# Patient Record
Sex: MALE | Race: WHITE | Employment: OTHER | URBAN - NONMETROPOLITAN AREA
[De-identification: names, ages, dates, MRNs, and addresses within clinical notes are randomized per-mention and may not be internally consistent; named-entity substitution may affect disease eponyms.]

---

## 2017-03-23 PROBLEM — F10.20 ALCOHOLIC (HCC): Status: ACTIVE | Noted: 2017-03-23

## 2017-03-23 PROBLEM — I34.2 NON-RHEUMATIC MITRAL VALVE STENOSIS: Status: ACTIVE | Noted: 2017-03-23

## 2017-03-27 PROBLEM — Z98.890 S/P CARDIAC CATHETERIZATION: Status: ACTIVE | Noted: 2017-03-27

## 2017-03-28 ENCOUNTER — TELEPHONE (OUTPATIENT)
Dept: CARDIOLOGY | Age: 62
End: 2017-03-28

## 2017-04-07 ENCOUNTER — TELEPHONE (OUTPATIENT)
Dept: CARDIOLOGY | Age: 62
End: 2017-04-07

## 2017-04-19 ENCOUNTER — TELEPHONE (OUTPATIENT)
Dept: CARDIOLOGY | Age: 62
End: 2017-04-19

## 2017-05-12 PROBLEM — Z45.02 DEFIBRILLATOR DISCHARGE: Status: ACTIVE | Noted: 2017-05-12

## 2017-05-13 ENCOUNTER — TELEPHONE (OUTPATIENT)
Dept: FAMILY MEDICINE CLINIC | Age: 62
End: 2017-05-13

## 2017-05-15 ENCOUNTER — TELEPHONE (OUTPATIENT)
Dept: INTERNAL MEDICINE | Age: 62
End: 2017-05-15

## 2017-08-24 ENCOUNTER — HOSPITAL ENCOUNTER (EMERGENCY)
Age: 62
Discharge: HOME | End: 2017-08-24
Payer: MEDICAID

## 2017-08-24 VITALS
OXYGEN SATURATION: 96 % | WEIGHT: 146 LBS | SYSTOLIC BLOOD PRESSURE: 148 MMHG | DIASTOLIC BLOOD PRESSURE: 67 MMHG | BODY MASS INDEX: 25.06 KG/M2 | RESPIRATION RATE: 18 BRPM | HEART RATE: 85 BPM | TEMPERATURE: 98.2 F

## 2017-08-24 DIAGNOSIS — L02.411 CUTANEOUS ABSCESS OF RIGHT AXILLA: Primary | ICD-10-CM

## 2017-08-24 PROCEDURE — 99202 OFFICE O/P NEW SF 15 MIN: CPT | Performed by: NURSE PRACTITIONER

## 2017-08-24 PROCEDURE — 99212 OFFICE O/P EST SF 10 MIN: CPT

## 2017-08-24 RX ORDER — LIDOCAINE 50 MG/G
OINTMENT TOPICAL
Qty: 10 G | Refills: 0 | Status: ON HOLD | OUTPATIENT
Start: 2017-08-24 | End: 2017-10-26

## 2017-08-24 RX ORDER — AMOXICILLIN AND CLAVULANATE POTASSIUM 875; 125 MG/1; MG/1
1 TABLET, FILM COATED ORAL 2 TIMES DAILY
Qty: 20 TABLET | Refills: 0 | Status: SHIPPED | OUTPATIENT
Start: 2017-08-24 | End: 2017-09-03

## 2017-08-24 ASSESSMENT — ENCOUNTER SYMPTOMS
SHORTNESS OF BREATH: 0
CHEST TIGHTNESS: 0
NAUSEA: 0
COLOR CHANGE: 1
COUGH: 0
VOMITING: 0

## 2017-10-25 ENCOUNTER — HOSPITAL ENCOUNTER (INPATIENT)
Age: 62
LOS: 5 days | Discharge: HOME | DRG: 720 | End: 2017-10-30
Attending: FAMILY MEDICINE | Admitting: INTERNAL MEDICINE
Payer: MEDICAID

## 2017-10-25 ENCOUNTER — APPOINTMENT (OUTPATIENT)
Dept: GENERAL RADIOLOGY | Age: 62
DRG: 720 | End: 2017-10-25
Payer: MEDICAID

## 2017-10-25 ENCOUNTER — APPOINTMENT (OUTPATIENT)
Dept: CT IMAGING | Age: 62
DRG: 720 | End: 2017-10-25
Payer: MEDICAID

## 2017-10-25 DIAGNOSIS — I26.99 PE (PULMONARY THROMBOEMBOLISM) (HCC): Primary | ICD-10-CM

## 2017-10-25 DIAGNOSIS — J44.1 COPD EXACERBATION (HCC): ICD-10-CM

## 2017-10-25 DIAGNOSIS — J18.9 PNEUMONIA DUE TO ORGANISM: ICD-10-CM

## 2017-10-25 DIAGNOSIS — Z72.0 TOBACCO ABUSE: ICD-10-CM

## 2017-10-25 DIAGNOSIS — M48.56XA NON-TRAUMATIC COMPRESSION FRACTURE OF SECOND LUMBAR VERTEBRA, INITIAL ENCOUNTER: ICD-10-CM

## 2017-10-25 PROBLEM — A41.9 SEPSIS (HCC): Status: ACTIVE | Noted: 2017-10-25

## 2017-10-25 LAB
ACETAMINOPHEN LEVEL: < 5 UG/ML (ref 0–20)
ALBUMIN SERPL-MCNC: 2.6 G/DL (ref 3.5–5.1)
ALLEN TEST: POSITIVE
ALP BLD-CCNC: 74 U/L (ref 38–126)
ALT SERPL-CCNC: 15 U/L (ref 11–66)
ANION GAP SERPL CALCULATED.3IONS-SCNC: 13 MEQ/L (ref 8–16)
ANISOCYTOSIS: ABNORMAL
APTT: 30.6 SECONDS (ref 22–38)
AST SERPL-CCNC: 23 U/L (ref 5–40)
BASE EXCESS (CALCULATED): 1.9 MMOL/L (ref -2.5–2.5)
BASOPHILS # BLD: 0.2 %
BASOPHILS ABSOLUTE: 0 THOU/MM3 (ref 0–0.1)
BILIRUB SERPL-MCNC: 0.6 MG/DL (ref 0.3–1.2)
BILIRUBIN DIRECT: < 0.2 MG/DL (ref 0–0.3)
BUN BLDV-MCNC: 11 MG/DL (ref 7–22)
CALCIUM SERPL-MCNC: 9.1 MG/DL (ref 8.5–10.5)
CHLORIDE BLD-SCNC: 87 MEQ/L (ref 98–111)
CO2: 25 MEQ/L (ref 23–33)
COLLECTED BY:: ABNORMAL
CREAT SERPL-MCNC: 0.6 MG/DL (ref 0.4–1.2)
DEVICE: ABNORMAL
EOSINOPHIL # BLD: 0 %
EOSINOPHILS ABSOLUTE: 0 THOU/MM3 (ref 0–0.4)
ETHYL ALCOHOL, SERUM: < 0.01 %
FLU A ANTIGEN: NEGATIVE
FLU B ANTIGEN: NEGATIVE
GFR SERPL CREATININE-BSD FRML MDRD: > 90 ML/MIN/1.73M2
GLUCOSE BLD-MCNC: 99 MG/DL (ref 70–108)
HCO3: 25 MMOL/L (ref 23–28)
HCT VFR BLD CALC: 38.7 % (ref 42–52)
HEMOGLOBIN: 13.2 GM/DL (ref 14–18)
IFIO2: 2
INR BLD: 1.15 (ref 0.85–1.13)
LACTIC ACID: 1 MMOL/L (ref 0.5–2.2)
LIPASE: 21.8 U/L (ref 5.6–51.3)
LYMPHOCYTES # BLD: 2.4 %
LYMPHOCYTES ABSOLUTE: 0.5 THOU/MM3 (ref 1–4.8)
MAGNESIUM: 1.9 MG/DL (ref 1.6–2.4)
MCH RBC QN AUTO: 31.8 PG (ref 27–31)
MCHC RBC AUTO-ENTMCNC: 34 GM/DL (ref 33–37)
MCV RBC AUTO: 93.6 FL (ref 80–94)
MONOCYTES # BLD: 7.8 %
MONOCYTES ABSOLUTE: 1.5 THOU/MM3 (ref 0.4–1.3)
NUCLEATED RED BLOOD CELLS: 0 /100 WBC
O2 SATURATION: 93 %
OSMOLALITY CALCULATION: 250.9 MOSMOL/KG (ref 275–300)
PCO2: 35 MMHG (ref 35–45)
PDW BLD-RTO: 15.5 % (ref 11.5–14.5)
PH BLOOD GAS: 7.47 (ref 7.35–7.45)
PLATELET # BLD: 274 THOU/MM3 (ref 130–400)
PMV BLD AUTO: 8.5 MCM (ref 7.4–10.4)
PO2: 61 MMHG (ref 71–104)
POTASSIUM SERPL-SCNC: 4.6 MEQ/L (ref 3.5–5.2)
PRO-BNP: ABNORMAL PG/ML (ref 0–900)
PROCALCITONIN: 1.31 NG/ML (ref 0.01–0.09)
RBC # BLD: 4.14 MILL/MM3 (ref 4.7–6.1)
SALICYLATE, SERUM: < 0.3 MG/DL (ref 2–10)
SEG NEUTROPHILS: 89.6 %
SEGMENTED NEUTROPHILS ABSOLUTE COUNT: 17.1 THOU/MM3 (ref 1.8–7.7)
SODIUM BLD-SCNC: 125 MEQ/L (ref 135–145)
SOURCE, BLOOD GAS: ABNORMAL
TOTAL PROTEIN: 6.7 G/DL (ref 6.1–8)
TROPONIN T: < 0.01 NG/ML
WBC # BLD: 19.1 THOU/MM3 (ref 4.8–10.8)

## 2017-10-25 PROCEDURE — 87077 CULTURE AEROBIC IDENTIFY: CPT

## 2017-10-25 PROCEDURE — 83605 ASSAY OF LACTIC ACID: CPT

## 2017-10-25 PROCEDURE — 87081 CULTURE SCREEN ONLY: CPT

## 2017-10-25 PROCEDURE — 80053 COMPREHEN METABOLIC PANEL: CPT

## 2017-10-25 PROCEDURE — 96365 THER/PROPH/DIAG IV INF INIT: CPT

## 2017-10-25 PROCEDURE — 83735 ASSAY OF MAGNESIUM: CPT

## 2017-10-25 PROCEDURE — 83880 ASSAY OF NATRIURETIC PEPTIDE: CPT

## 2017-10-25 PROCEDURE — 82803 BLOOD GASES ANY COMBINATION: CPT

## 2017-10-25 PROCEDURE — 2580000003 HC RX 258: Performed by: FAMILY MEDICINE

## 2017-10-25 PROCEDURE — A6212 FOAM DRG <=16 SQ IN W/BORDER: HCPCS

## 2017-10-25 PROCEDURE — 71010 XR CHEST PORTABLE: CPT

## 2017-10-25 PROCEDURE — 74177 CT ABD & PELVIS W/CONTRAST: CPT

## 2017-10-25 PROCEDURE — 85730 THROMBOPLASTIN TIME PARTIAL: CPT

## 2017-10-25 PROCEDURE — 93005 ELECTROCARDIOGRAM TRACING: CPT

## 2017-10-25 PROCEDURE — 96375 TX/PRO/DX INJ NEW DRUG ADDON: CPT

## 2017-10-25 PROCEDURE — 6360000004 HC RX CONTRAST MEDICATION: Performed by: FAMILY MEDICINE

## 2017-10-25 PROCEDURE — 6360000002 HC RX W HCPCS: Performed by: FAMILY MEDICINE

## 2017-10-25 PROCEDURE — G0480 DRUG TEST DEF 1-7 CLASSES: HCPCS

## 2017-10-25 PROCEDURE — 85610 PROTHROMBIN TIME: CPT

## 2017-10-25 PROCEDURE — 84484 ASSAY OF TROPONIN QUANT: CPT

## 2017-10-25 PROCEDURE — 99285 EMERGENCY DEPT VISIT HI MDM: CPT

## 2017-10-25 PROCEDURE — 82248 BILIRUBIN DIRECT: CPT

## 2017-10-25 PROCEDURE — 87641 MR-STAPH DNA AMP PROBE: CPT

## 2017-10-25 PROCEDURE — 85025 COMPLETE CBC W/AUTO DIFF WBC: CPT

## 2017-10-25 PROCEDURE — 87804 INFLUENZA ASSAY W/OPTIC: CPT

## 2017-10-25 PROCEDURE — 36600 WITHDRAWAL OF ARTERIAL BLOOD: CPT

## 2017-10-25 PROCEDURE — 87186 SC STD MICRODIL/AGAR DIL: CPT

## 2017-10-25 PROCEDURE — 36415 COLL VENOUS BLD VENIPUNCTURE: CPT

## 2017-10-25 PROCEDURE — 83690 ASSAY OF LIPASE: CPT

## 2017-10-25 PROCEDURE — 87040 BLOOD CULTURE FOR BACTERIA: CPT

## 2017-10-25 PROCEDURE — 87184 SC STD DISK METHOD PER PLATE: CPT

## 2017-10-25 PROCEDURE — 99223 1ST HOSP IP/OBS HIGH 75: CPT | Performed by: INTERNAL MEDICINE

## 2017-10-25 PROCEDURE — 84145 PROCALCITONIN (PCT): CPT

## 2017-10-25 PROCEDURE — 2060000000 HC ICU INTERMEDIATE R&B

## 2017-10-25 PROCEDURE — 71275 CT ANGIOGRAPHY CHEST: CPT

## 2017-10-25 RX ORDER — ONDANSETRON 2 MG/ML
4 INJECTION INTRAMUSCULAR; INTRAVENOUS EVERY 6 HOURS PRN
Status: DISCONTINUED | OUTPATIENT
Start: 2017-10-25 | End: 2017-10-30 | Stop reason: HOSPADM

## 2017-10-25 RX ORDER — THIAMINE MONONITRATE (VIT B1) 100 MG
100 TABLET ORAL DAILY
Status: DISCONTINUED | OUTPATIENT
Start: 2017-10-26 | End: 2017-10-30 | Stop reason: HOSPADM

## 2017-10-25 RX ORDER — HEPARIN SODIUM 10000 [USP'U]/100ML
18 INJECTION, SOLUTION INTRAVENOUS CONTINUOUS
Status: DISCONTINUED | OUTPATIENT
Start: 2017-10-25 | End: 2017-10-27

## 2017-10-25 RX ORDER — SODIUM CHLORIDE 9 MG/ML
INJECTION, SOLUTION INTRAVENOUS CONTINUOUS
Status: DISCONTINUED | OUTPATIENT
Start: 2017-10-26 | End: 2017-10-26

## 2017-10-25 RX ORDER — DOCUSATE SODIUM 100 MG/1
100 CAPSULE, LIQUID FILLED ORAL 2 TIMES DAILY
Status: DISCONTINUED | OUTPATIENT
Start: 2017-10-26 | End: 2017-10-30 | Stop reason: HOSPADM

## 2017-10-25 RX ORDER — HEPARIN SODIUM 1000 [USP'U]/ML
80 INJECTION, SOLUTION INTRAVENOUS; SUBCUTANEOUS ONCE
Status: COMPLETED | OUTPATIENT
Start: 2017-10-25 | End: 2017-10-25

## 2017-10-25 RX ORDER — SODIUM CHLORIDE 0.9 % (FLUSH) 0.9 %
10 SYRINGE (ML) INJECTION EVERY 12 HOURS SCHEDULED
Status: DISCONTINUED | OUTPATIENT
Start: 2017-10-26 | End: 2017-10-30 | Stop reason: HOSPADM

## 2017-10-25 RX ORDER — HEPARIN SODIUM 1000 [USP'U]/ML
80 INJECTION, SOLUTION INTRAVENOUS; SUBCUTANEOUS PRN
Status: DISCONTINUED | OUTPATIENT
Start: 2017-10-25 | End: 2017-10-27

## 2017-10-25 RX ORDER — SODIUM CHLORIDE 0.9 % (FLUSH) 0.9 %
10 SYRINGE (ML) INJECTION PRN
Status: DISCONTINUED | OUTPATIENT
Start: 2017-10-25 | End: 2017-10-30 | Stop reason: HOSPADM

## 2017-10-25 RX ORDER — ACETAMINOPHEN 325 MG/1
650 TABLET ORAL EVERY 4 HOURS PRN
Status: DISCONTINUED | OUTPATIENT
Start: 2017-10-25 | End: 2017-10-30 | Stop reason: HOSPADM

## 2017-10-25 RX ORDER — FOLIC ACID 1 MG/1
1 TABLET ORAL DAILY
Status: DISCONTINUED | OUTPATIENT
Start: 2017-10-26 | End: 2017-10-30 | Stop reason: HOSPADM

## 2017-10-25 RX ORDER — NICOTINE 21 MG/24HR
1 PATCH, TRANSDERMAL 24 HOURS TRANSDERMAL DAILY
Status: DISCONTINUED | OUTPATIENT
Start: 2017-10-26 | End: 2017-10-30 | Stop reason: HOSPADM

## 2017-10-25 RX ORDER — SODIUM CHLORIDE 9 MG/ML
INJECTION, SOLUTION INTRAVENOUS CONTINUOUS
Status: DISCONTINUED | OUTPATIENT
Start: 2017-10-25 | End: 2017-10-26 | Stop reason: SDUPTHER

## 2017-10-25 RX ORDER — MULTIVITAMIN WITH FOLIC ACID 400 MCG
1 TABLET ORAL DAILY
Status: DISCONTINUED | OUTPATIENT
Start: 2017-10-26 | End: 2017-10-30 | Stop reason: HOSPADM

## 2017-10-25 RX ORDER — HEPARIN SODIUM 1000 [USP'U]/ML
40 INJECTION, SOLUTION INTRAVENOUS; SUBCUTANEOUS PRN
Status: DISCONTINUED | OUTPATIENT
Start: 2017-10-25 | End: 2017-10-27

## 2017-10-25 RX ORDER — CARVEDILOL 3.12 MG/1
3.12 TABLET ORAL 2 TIMES DAILY WITH MEALS
Status: DISCONTINUED | OUTPATIENT
Start: 2017-10-26 | End: 2017-10-30 | Stop reason: HOSPADM

## 2017-10-25 RX ORDER — METHYLPREDNISOLONE SODIUM SUCCINATE 125 MG/2ML
80 INJECTION, POWDER, LYOPHILIZED, FOR SOLUTION INTRAMUSCULAR; INTRAVENOUS EVERY 8 HOURS
Status: DISCONTINUED | OUTPATIENT
Start: 2017-10-26 | End: 2017-10-26

## 2017-10-25 RX ORDER — DIGOXIN 125 MCG
125 TABLET ORAL DAILY
Status: DISCONTINUED | OUTPATIENT
Start: 2017-10-26 | End: 2017-10-30 | Stop reason: HOSPADM

## 2017-10-25 RX ORDER — IPRATROPIUM BROMIDE AND ALBUTEROL SULFATE 2.5; .5 MG/3ML; MG/3ML
1 SOLUTION RESPIRATORY (INHALATION)
Status: DISCONTINUED | OUTPATIENT
Start: 2017-10-26 | End: 2017-10-30 | Stop reason: HOSPADM

## 2017-10-25 RX ORDER — LISINOPRIL 5 MG/1
5 TABLET ORAL DAILY
Status: DISCONTINUED | OUTPATIENT
Start: 2017-10-26 | End: 2017-10-30 | Stop reason: HOSPADM

## 2017-10-25 RX ADMIN — CEFTRIAXONE 2 G: 2 INJECTION, POWDER, FOR SOLUTION INTRAMUSCULAR; INTRAVENOUS at 20:44

## 2017-10-25 RX ADMIN — SODIUM CHLORIDE: 9 INJECTION, SOLUTION INTRAVENOUS at 18:42

## 2017-10-25 RX ADMIN — IOPAMIDOL 80 ML: 755 INJECTION, SOLUTION INTRAVENOUS at 20:24

## 2017-10-25 RX ADMIN — AZITHROMYCIN MONOHYDRATE 500 MG: 500 INJECTION, POWDER, LYOPHILIZED, FOR SOLUTION INTRAVENOUS at 20:44

## 2017-10-25 RX ADMIN — HEPARIN SODIUM 5800 UNITS: 1000 INJECTION, SOLUTION INTRAVENOUS; SUBCUTANEOUS at 21:11

## 2017-10-25 RX ADMIN — HEPARIN SODIUM AND DEXTROSE 18 UNITS/KG/HR: 10000; 5 INJECTION INTRAVENOUS at 21:11

## 2017-10-25 ASSESSMENT — ENCOUNTER SYMPTOMS
ABDOMINAL PAIN: 0
COUGH: 1
NAUSEA: 0
EYE REDNESS: 0
VOMITING: 0
BACK PAIN: 1
RHINORRHEA: 0
DIARRHEA: 0
SORE THROAT: 0
SHORTNESS OF BREATH: 1
EYE DISCHARGE: 0
WHEEZING: 0

## 2017-10-25 ASSESSMENT — PAIN DESCRIPTION - LOCATION: LOCATION: BACK;CHEST

## 2017-10-25 ASSESSMENT — PAIN SCALES - GENERAL: PAINLEVEL_OUTOF10: 6

## 2017-10-25 ASSESSMENT — PAIN DESCRIPTION - PAIN TYPE: TYPE: ACUTE PAIN

## 2017-10-25 NOTE — Clinical Note
Patient Class: Inpatient [101]   REQUIRED: Diagnosis: Pulmonary embolism, bilateral [facility]) [[id number]]   Estimated Length of Stay: Estimated stay of more than 2 midnights   Future Attending Provider: Jose Posada [[id number]]   Admitting Provider: Jose Posada [[id number]]   Telemetry Bed Required?: Yes

## 2017-10-25 NOTE — ED PROVIDER NOTES
Musculoskeletal: Positive for back pain. Negative for arthralgias, joint swelling and neck pain. Skin: Negative for pallor and rash. Allergic/Immunologic: Negative for environmental allergies. Neurological: Negative for dizziness, syncope, weakness, light-headedness and headaches. Hematological: Negative for adenopathy. Psychiatric/Behavioral: Negative for agitation, confusion, dysphoric mood and suicidal ideas. The patient is not nervous/anxious. PAST MEDICAL HISTORY    has a past medical history of Brain tumor [facility]); COPD (chronic obstructive pulmonary disease) ([facility] [address] Coronary artery disease involving native coronary artery of native heart without angina pectoris; Essential hypertension; and S/P cardiac catheterization: 3/27/2017: 40-50% mid-LAD, 60-70% mid-Ramus with FFR of 0.9, and 50% mid-RCA. LVEF 25-30%. Severe AI 3+. .    SURGICAL HISTORY      has a past surgical history that includes Colonoscopy and brain surgery. CURRENT MEDICATIONS       Current Discharge Medication List      CONTINUE these medications which have NOT CHANGED    Details   Heating Pads (HEATING PAD DRY HEAT) PADS 1 Device by Does not apply route as needed (pain)  Qty: 1 each, Refills: 0      lidocaine (XYLOCAINE) 5 % ointment Apply topically as needed.   Qty: 10 g, Refills: 0      aspirin 325 MG EC tablet Take 1 tablet by mouth daily  Qty: 30 tablet, Refills: 3      lisinopril (PRINIVIL;ZESTRIL) 5 MG tablet Take 1 tablet by mouth daily  Qty: 30 tablet, Refills: 0      carvedilol (COREG) 3.125 MG tablet Take 1 tablet by mouth 2 times daily (with meals)  Qty: 60 tablet, Refills: 0      digoxin (LANOXIN) 125 MCG tablet Take 1 tablet by mouth daily  Qty: 30 tablet, Refills: 0      spironolactone (ALDACTONE) 25 MG tablet Take 1 tablet by mouth daily  Qty: 30 tablet, Refills: 0      vitamin B-1 100 MG tablet Take 1 tablet by mouth daily  Qty: 30 tablet, Refills: 0      Multiple Vitamin (MULTIVITAMIN) tablet Take 1 tablet by and no guarding. Musculoskeletal: Normal range of motion. He exhibits no edema. Neurological: He is alert and oriented to person, place, and time. He exhibits normal muscle tone. He displays no seizure activity. GCS eye subscore is 4. GCS verbal subscore is 5. GCS motor subscore is 6. Skin: Skin is warm and dry. No rash noted. He is not diaphoretic. Psychiatric: He has a normal mood and affect. His behavior is normal. Thought content normal.       DIFFERENTIAL DIAGNOSIS not limited to:   Pneumonia, pneumothorax, ACS, COPD, colitis, gastritis, aortic dissection, PE    DIAGNOSTIC RESULTS     EKG: All EKG's are interpreted by the Emergency Department Physician who either signs or Co-signs this chart in the absence of a cardiologist.  EKG read and interpreted by myself gives impression of sinus tachycardia with heart rate of 106; interval 124; QRS 98;QTc 448; axis 56; No STEMI; possible left atrial enlargement; left ventricular hypertrophy with repolarization abnormality     RADIOLOGY: non-plain film images(s) such as CT, Ultrasound and MRI are read by the radiologist.  CTA Chest W WO Contrast   Final Result   Addendum 1 of 1    ADDENDUM #1      The reported L1 compression fracture is actually a compression fracture of    T12. Final report electronically signed by Dr. Paul Scott on 10/25/2017    8:56 PM       ORIGINAL REPORT    PROCEDURE: CTA CHEST W WO CONTRAST      CLINICAL INFORMATION: Chest pain      TECHNIQUE: CTA of the chest was performed following administration of 80    mL Isovue-370 intravenous contrast. Axial images as well as coronal and    sagittal reconstructions were obtained. All CT scans at this facility use dose modulation, iterative    reconstruction, and/or weight-based dosing when appropriate to reduce    radiation dose to as low as reasonably achievable.       COMPARISON: None      FINDINGS: There is acute thrombus extending from the right pulmonary    artery into right middle Apparent mural thickening of the sigmoid colon which may be secondary to underdistention. However, a neoplastic process cannot be excluded and direct visualization is recommended. 5. Grade 4 T12 compression fracture of indeterminate age. 6. Sclerotic lesions at the L3 vertebra suspicious for metastatic disease. 7. Small metallic density posterior to the L4 vertebra of indeterminate etiology but possibly related to prior procedure. Final report electronically signed by Dr. Torey Zavala on 10/25/2017 8:55 PM      XR Chest Portable   Final Result   Left mid and upper lung pneumonia. **This report has been created using voice recognition software. It may contain minor errors which are inherent in voice recognition technology. **      Final report electronically signed by Dr. Torey Zavala on 10/25/2017 6:58 PM          LABS:   Results for orders placed or performed during the hospital encounter of 10/25/17   Rapid influenza A/B antigens   Result Value Ref Range    Flu A Antigen NEGATIVE NEGATIVE    Flu B Antigen NEGATIVE NEGATIVE   Magnesium   Result Value Ref Range    Magnesium 1.9 1.6 - 2.4 mg/dL   CBC auto differential   Result Value Ref Range    WBC 19.1 (H) 4.8 - 10.8 thou/mm3    RBC 4.14 (L) 4.70 - 6.10 mill/mm3    Hemoglobin 13.2 (L) 14.0 - 18.0 gm/dl    Hematocrit 38.7 (L) 42.0 - 52.0 %    MCV 93.6 80.0 - 94.0 fL    MCH 31.8 (H) 27.0 - 31.0 pg    MCHC 34.0 33.0 - 37.0 gm/dl    RDW 15.5 (H) 11.5 - 14.5 %    Platelets 883 904 - 602 thou/mm3    MPV 8.5 7.4 - 10.4 mcm    Seg Neutrophils 89.6 %    Lymphocytes 2.4 %    Monocytes 7.8 %    Eosinophils 0.0 %    Basophils 0.2 %    nRBC 0 /100 wbc    Anisocytosis 1+ Absent    Segs Absolute 17.1 (H) 1.8 - 7.7 thou/mm3    Lymphocytes # 0.5 (L) 1.0 - 4.8 thou/mm3    Monocytes # 1.5 (H) 0.4 - 1.3 thou/mm3    Eosinophils # 0.0 0.0 - 0.4 thou/mm3    Basophils # 0.0 0.0 - 0.1 thou/mm3   Basic Metabolic Panel   Result Value Ref Range    Sodium 125 (L) 135 - 145 meq/L    Potassium 4.6 3.5 - 5.2 meq/L    Chloride 87 (L) 98 - 111 meq/L    CO2 25 23 - 33 meq/L    Glucose 99 70 - 108 mg/dL    BUN 11 7 - 22 mg/dL    CREATININE 0.6 0.4 - 1.2 mg/dL    Calcium 9.1 8.5 - 10.5 mg/dL   Hepatic function panel   Result Value Ref Range    Alb 2.6 (L) 3.5 - 5.1 g/dL    Total Bilirubin 0.6 0.3 - 1.2 mg/dL    Bilirubin, Direct <0.2 0.0 - 0.3 mg/dL    Alkaline Phosphatase 74 38 - 126 U/L    AST 23 5 - 40 U/L    ALT 15 11 - 66 U/L    Total Protein 6.7 6.1 - 8.0 g/dL   Ethanol   Result Value Ref Range    ETHYL ALCOHOL, SERUM < 0.01 0.00 %   Lipase   Result Value Ref Range    Lipase 21.8 5.6 - 44.4 U/L   Salicylate Level   Result Value Ref Range    Salicylate, Serum < 0.3 (L) 2.0 - 10.0 mg/dL   Acetaminophen level   Result Value Ref Range    Acetaminophen Level < 5.0 0.0 - 20.0 ug/mL   Troponin   Result Value Ref Range    Troponin T < 0.010 ng/ml   Brain Natriuretic Peptide   Result Value Ref Range    Pro-BNP 39570.0 (H) 0.0 - 900.0 pg/mL   Anion Gap   Result Value Ref Range    Anion Gap 13.0 8.0 - 16.0 meq/L   Osmolality   Result Value Ref Range    Osmolality Calc 250.9 (L) 275.0 - 300 mOsmol/kg   Glomerular Filtration Rate, Estimated   Result Value Ref Range    Est, Glom Filt Rate >90 ml/min/1.73m2   Procalcitonin   Result Value Ref Range    Procalcitonin 1.31 (H) 0.01 - 0.09 ng/mL   Blood Gas, Arterial   Result Value Ref Range    pH, Blood Gas 7.47 (H) 7.35 - 7.45    PCO2 35 35 - 45 mmhg    PO2 61 (L) 71 - 104 mmhg    HCO3 25 23 - 28 mmol/l    Base Excess (Calculated) 1.9 -2.5 - 2.5 mmol/l    O2 Sat 93 %    IFIO2 2     DEVICE Cannula     Piyush Test Positive     Source: L Radial     COLLECTED BY: [id number]    APTT   Result Value Ref Range    aPTT 30.6 22.0 - 38.0 seconds   Protime-INR   Result Value Ref Range    INR 1.15 (H) 0.85 - 1.13   Lactic Acid, Plasma   Result Value Ref Range    Lactic Acid 1.0 0.5 - 2.2 mmol/L   EKG 12 Lead   Result Value Ref Range    Ventricular Rate 106 BPM Atrial Rate 106 BPM    P-R Interval 124 ms    QRS Duration 98 ms    Q-T Interval 338 ms    QTc Calculation (Bazett) 448 ms    P Axis 53 degrees    R Axis 56 degrees    T Axis -139 degrees       EMERGENCY DEPARTMENT COURSE:   Vitals:    Vitals:    10/25/17 2032 10/25/17 2135 10/25/17 2258 10/25/17 2347   BP: (!) 138/43 (!) 113/53 (!) 116/57 (!) 153/67   Pulse: 98 95 89 101   Resp: 24 20 20 20   Temp:    100.7 °F (38.2 °C)   TempSrc:    Oral   SpO2: 95% 96% 97% 90%   Weight:    140 lb 9.6 oz (63.8 kg)   Height:    5' 4\" (1.626 m)       Orders Placed This Encounter   Medications    DISCONTD: 0.9 % sodium chloride infusion    DISCONTD: piperacillin-tazobactam (ZOSYN) 3.375 g in dextrose 50 mL IVPB (premix)    cefTRIAXone (ROCEPHIN) 2 g in sterile water 20 mL IV syringe    azithromycin (ZITHROMAX) 500 mg in D5W 250ml addavial    iopamidol (ISOVUE-370) 76 % injection 80 mL    heparin (porcine) injection 5,800 Units    heparin (porcine) injection 5,800 Units    heparin (porcine) injection 2,900 Units    heparin 25,000 units in dextrose 5% 250 mL infusion    azithromycin (ZITHROMAX) 500 mg in D5W 250ml addavial    cefTRIAXone (ROCEPHIN) 1 g in sterile water 10 mL IV syringe    lisinopril (PRINIVIL;ZESTRIL) tablet 5 mg    carvedilol (COREG) tablet 3.125 mg    digoxin (LANOXIN) tablet 125 mcg    vitamin B-1 (THIAMINE) tablet 100 mg    multivitamin 1 tablet    folic acid (FOLVITE) tablet 1 mg    aspirin EC tablet 325 mg    DISCONTD: Heating Pad Dry Heat PADS 1 Device    0.9 % sodium chloride infusion    sodium chloride flush 0.9 % injection 10 mL    sodium chloride flush 0.9 % injection 10 mL    acetaminophen (TYLENOL) tablet 650 mg    docusate sodium (COLACE) capsule 100 mg    ondansetron (ZOFRAN) injection 4 mg    nicotine (NICODERM CQ) 21 MG/24HR 1 patch    ipratropium-albuterol (DUONEB) nebulizer solution 1 ampule    methylPREDNISolone sodium (SOLU-MEDROL) injection 80 mg    influenza

## 2017-10-26 ENCOUNTER — APPOINTMENT (OUTPATIENT)
Dept: INTERVENTIONAL RADIOLOGY/VASCULAR | Age: 62
DRG: 720 | End: 2017-10-26
Payer: MEDICAID

## 2017-10-26 LAB
AMPHETAMINE+METHAMPHETAMINE URINE SCREEN: NEGATIVE
ANION GAP SERPL CALCULATED.3IONS-SCNC: 11 MEQ/L (ref 8–16)
ANISOCYTOSIS: ABNORMAL
APTT: 36.2 SECONDS (ref 22–38)
APTT: 52.4 SECONDS (ref 22–38)
APTT: 74.9 SECONDS (ref 22–38)
BACTERIA: ABNORMAL /HPF
BARBITURATE QUANTITATIVE URINE: NEGATIVE
BASOPHILS # BLD: 0.1 %
BASOPHILS ABSOLUTE: 0 THOU/MM3 (ref 0–0.1)
BENZODIAZEPINE QUANTITATIVE URINE: NEGATIVE
BILIRUBIN URINE: NEGATIVE
BLOOD, URINE: ABNORMAL
BUN BLDV-MCNC: 10 MG/DL (ref 7–22)
CALCIUM SERPL-MCNC: 8.2 MG/DL (ref 8.5–10.5)
CANNABINOID QUANTITATIVE URINE: NEGATIVE
CASTS 2: ABNORMAL /LPF
CASTS UA: ABNORMAL /LPF
CHARACTER, URINE: CLEAR
CHLORIDE BLD-SCNC: 93 MEQ/L (ref 98–111)
CO2: 24 MEQ/L (ref 23–33)
COCAINE METABOLITE QUANTITATIVE URINE: NEGATIVE
COLOR: ABNORMAL
CREAT SERPL-MCNC: 0.5 MG/DL (ref 0.4–1.2)
CRYSTALS, UA: ABNORMAL
EKG ATRIAL RATE: 106 BPM
EKG P AXIS: 53 DEGREES
EKG P-R INTERVAL: 124 MS
EKG Q-T INTERVAL: 338 MS
EKG QRS DURATION: 98 MS
EKG QTC CALCULATION (BAZETT): 448 MS
EKG R AXIS: 56 DEGREES
EKG T AXIS: -139 DEGREES
EKG VENTRICULAR RATE: 106 BPM
EOSINOPHIL # BLD: 0 %
EOSINOPHILS ABSOLUTE: 0 THOU/MM3 (ref 0–0.4)
EPITHELIAL CELLS, UA: ABNORMAL /HPF
GFR SERPL CREATININE-BSD FRML MDRD: > 90 ML/MIN/1.73M2
GLUCOSE BLD-MCNC: 83 MG/DL (ref 70–108)
GLUCOSE URINE: NEGATIVE MG/DL
HCT VFR BLD CALC: 37.5 % (ref 42–52)
HEMOGLOBIN: 12.6 GM/DL (ref 14–18)
KETONES, URINE: NEGATIVE
LEUKOCYTE ESTERASE, URINE: NEGATIVE
LV EF: 28 %
LVEF MODALITY: NORMAL
LYMPHOCYTES # BLD: 3.2 %
LYMPHOCYTES ABSOLUTE: 0.5 THOU/MM3 (ref 1–4.8)
MAGNESIUM: 1.8 MG/DL (ref 1.6–2.4)
MCH RBC QN AUTO: 31.5 PG (ref 27–31)
MCHC RBC AUTO-ENTMCNC: 33.5 GM/DL (ref 33–37)
MCV RBC AUTO: 93.8 FL (ref 80–94)
MISCELLANEOUS 2: ABNORMAL
MONOCYTES # BLD: 3.3 %
MONOCYTES ABSOLUTE: 0.5 THOU/MM3 (ref 0.4–1.3)
MRSA SCREEN RT-PCR: NEGATIVE
NITRITE, URINE: NEGATIVE
NUCLEATED RED BLOOD CELLS: 0 /100 WBC
OPIATES, URINE: NEGATIVE
OXYCODONE: NEGATIVE
PDW BLD-RTO: 15.7 % (ref 11.5–14.5)
PH UA: 6
PHENCYCLIDINE QUANTITATIVE URINE: NEGATIVE
PLATELET # BLD: 270 THOU/MM3 (ref 130–400)
PMV BLD AUTO: 8.9 MCM (ref 7.4–10.4)
POTASSIUM SERPL-SCNC: 3.6 MEQ/L (ref 3.5–5.2)
PROTEIN UA: 30
RBC # BLD: 4 MILL/MM3 (ref 4.7–6.1)
RBC URINE: ABNORMAL /HPF
RENAL EPITHELIAL, UA: ABNORMAL
SEG NEUTROPHILS: 93.4 %
SEGMENTED NEUTROPHILS ABSOLUTE COUNT: 13.2 THOU/MM3 (ref 1.8–7.7)
SODIUM BLD-SCNC: 128 MEQ/L (ref 135–145)
SODIUM BLD-SCNC: 132 MEQ/L (ref 135–145)
SPECIFIC GRAVITY, URINE: > 1.03 (ref 1–1.03)
UROBILINOGEN, URINE: 1 EU/DL
WBC # BLD: 14.1 THOU/MM3 (ref 4.8–10.8)
WBC UA: ABNORMAL /HPF
YEAST: ABNORMAL

## 2017-10-26 PROCEDURE — 83735 ASSAY OF MAGNESIUM: CPT

## 2017-10-26 PROCEDURE — 6360000002 HC RX W HCPCS: Performed by: INTERNAL MEDICINE

## 2017-10-26 PROCEDURE — 36415 COLL VENOUS BLD VENIPUNCTURE: CPT

## 2017-10-26 PROCEDURE — 99223 1ST HOSP IP/OBS HIGH 75: CPT | Performed by: INTERNAL MEDICINE

## 2017-10-26 PROCEDURE — 6370000000 HC RX 637 (ALT 250 FOR IP): Performed by: INTERNAL MEDICINE

## 2017-10-26 PROCEDURE — 85025 COMPLETE CBC W/AUTO DIFF WBC: CPT

## 2017-10-26 PROCEDURE — 93005 ELECTROCARDIOGRAM TRACING: CPT

## 2017-10-26 PROCEDURE — 87449 NOS EACH ORGANISM AG IA: CPT

## 2017-10-26 PROCEDURE — 2060000000 HC ICU INTERMEDIATE R&B

## 2017-10-26 PROCEDURE — 80307 DRUG TEST PRSMV CHEM ANLYZR: CPT

## 2017-10-26 PROCEDURE — 81001 URINALYSIS AUTO W/SCOPE: CPT

## 2017-10-26 PROCEDURE — 99233 SBSQ HOSP IP/OBS HIGH 50: CPT | Performed by: INTERNAL MEDICINE

## 2017-10-26 PROCEDURE — 93970 EXTREMITY STUDY: CPT

## 2017-10-26 PROCEDURE — 85730 THROMBOPLASTIN TIME PARTIAL: CPT

## 2017-10-26 PROCEDURE — 87899 AGENT NOS ASSAY W/OPTIC: CPT

## 2017-10-26 PROCEDURE — 80048 BASIC METABOLIC PNL TOTAL CA: CPT

## 2017-10-26 PROCEDURE — 93306 TTE W/DOPPLER COMPLETE: CPT

## 2017-10-26 PROCEDURE — 94640 AIRWAY INHALATION TREATMENT: CPT

## 2017-10-26 PROCEDURE — 2580000003 HC RX 258: Performed by: INTERNAL MEDICINE

## 2017-10-26 PROCEDURE — 6360000002 HC RX W HCPCS: Performed by: FAMILY MEDICINE

## 2017-10-26 PROCEDURE — 84295 ASSAY OF SERUM SODIUM: CPT

## 2017-10-26 RX ORDER — METHYLPREDNISOLONE SODIUM SUCCINATE 40 MG/ML
40 INJECTION, POWDER, LYOPHILIZED, FOR SOLUTION INTRAMUSCULAR; INTRAVENOUS DAILY
Status: DISCONTINUED | OUTPATIENT
Start: 2017-10-27 | End: 2017-10-27

## 2017-10-26 RX ORDER — POTASSIUM CHLORIDE 20 MEQ/1
20 TABLET, EXTENDED RELEASE ORAL ONCE
Status: COMPLETED | OUTPATIENT
Start: 2017-10-26 | End: 2017-10-26

## 2017-10-26 RX ORDER — MAGNESIUM SULFATE IN WATER 40 MG/ML
2 INJECTION, SOLUTION INTRAVENOUS ONCE
Status: COMPLETED | OUTPATIENT
Start: 2017-10-26 | End: 2017-10-26

## 2017-10-26 RX ORDER — DOXYCYCLINE HYCLATE 100 MG
100 TABLET ORAL EVERY 12 HOURS SCHEDULED
Status: DISCONTINUED | OUTPATIENT
Start: 2017-10-26 | End: 2017-10-30 | Stop reason: HOSPADM

## 2017-10-26 RX ORDER — POTASSIUM CHLORIDE 20 MEQ/1
20 TABLET, EXTENDED RELEASE ORAL 2 TIMES DAILY WITH MEALS
Status: DISCONTINUED | OUTPATIENT
Start: 2017-10-26 | End: 2017-10-28

## 2017-10-26 RX ADMIN — IPRATROPIUM BROMIDE AND ALBUTEROL SULFATE 1 AMPULE: .5; 3 SOLUTION RESPIRATORY (INHALATION) at 16:15

## 2017-10-26 RX ADMIN — Medication 100 MG: at 09:11

## 2017-10-26 RX ADMIN — DOCUSATE SODIUM 100 MG: 100 CAPSULE ORAL at 20:03

## 2017-10-26 RX ADMIN — SODIUM CHLORIDE: 9 INJECTION, SOLUTION INTRAVENOUS at 06:27

## 2017-10-26 RX ADMIN — IPRATROPIUM BROMIDE AND ALBUTEROL SULFATE 1 AMPULE: .5; 3 SOLUTION RESPIRATORY (INHALATION) at 11:55

## 2017-10-26 RX ADMIN — IPRATROPIUM BROMIDE AND ALBUTEROL SULFATE 1 AMPULE: .5; 3 SOLUTION RESPIRATORY (INHALATION) at 08:15

## 2017-10-26 RX ADMIN — POTASSIUM CHLORIDE 20 MEQ: 1500 TABLET, EXTENDED RELEASE ORAL at 17:01

## 2017-10-26 RX ADMIN — POTASSIUM CHLORIDE 20 MEQ: 1500 TABLET, EXTENDED RELEASE ORAL at 11:57

## 2017-10-26 RX ADMIN — THERA TABS 1 TABLET: TAB at 09:11

## 2017-10-26 RX ADMIN — DOXYCYCLINE HYCLATE 100 MG: 100 TABLET, COATED ORAL at 20:03

## 2017-10-26 RX ADMIN — METHYLPREDNISOLONE SODIUM SUCCINATE 80 MG: 125 INJECTION, POWDER, FOR SOLUTION INTRAMUSCULAR; INTRAVENOUS at 03:51

## 2017-10-26 RX ADMIN — SODIUM CHLORIDE: 9 INJECTION, SOLUTION INTRAVENOUS at 02:00

## 2017-10-26 RX ADMIN — DOCUSATE SODIUM 100 MG: 100 CAPSULE ORAL at 03:51

## 2017-10-26 RX ADMIN — DOCUSATE SODIUM 100 MG: 100 CAPSULE ORAL at 09:11

## 2017-10-26 RX ADMIN — HEPARIN SODIUM AND DEXTROSE 21 UNITS/KG/HR: 10000; 5 INJECTION INTRAVENOUS at 13:52

## 2017-10-26 RX ADMIN — CARVEDILOL 3.12 MG: 3.12 TABLET, FILM COATED ORAL at 08:13

## 2017-10-26 RX ADMIN — HEPARIN SODIUM 2900 UNITS: 1000 INJECTION, SOLUTION INTRAVENOUS; SUBCUTANEOUS at 05:22

## 2017-10-26 RX ADMIN — ASPIRIN 325 MG: 325 TABLET, DELAYED RELEASE ORAL at 09:11

## 2017-10-26 RX ADMIN — ACETAMINOPHEN 650 MG: 325 TABLET ORAL at 03:54

## 2017-10-26 RX ADMIN — Medication 10 ML: at 20:03

## 2017-10-26 RX ADMIN — LISINOPRIL 5 MG: 5 TABLET ORAL at 09:11

## 2017-10-26 RX ADMIN — DIGOXIN 125 MCG: 0.12 TABLET ORAL at 09:11

## 2017-10-26 RX ADMIN — IPRATROPIUM BROMIDE AND ALBUTEROL SULFATE 1 AMPULE: .5; 3 SOLUTION RESPIRATORY (INHALATION) at 19:39

## 2017-10-26 RX ADMIN — FOLIC ACID 1 MG: 1 TABLET ORAL at 09:11

## 2017-10-26 RX ADMIN — CARVEDILOL 3.12 MG: 3.12 TABLET, FILM COATED ORAL at 17:01

## 2017-10-26 RX ADMIN — WATER 1 G: 1 INJECTION INTRAMUSCULAR; INTRAVENOUS; SUBCUTANEOUS at 22:02

## 2017-10-26 RX ADMIN — MAGNESIUM SULFATE HEPTAHYDRATE 2 G: 40 INJECTION, SOLUTION INTRAVENOUS at 17:01

## 2017-10-26 ASSESSMENT — PAIN SCALES - GENERAL
PAINLEVEL_OUTOF10: 0

## 2017-10-26 NOTE — PROGRESS NOTES
Pharmacy Medication History Note      List of current medications patient is taking is complete. Source of information: Jerry, Junaid Ervin, Patient    Changes made to medication list:  Medications removed (include reason, ex. therapy complete or physician discontinued):  · Aspirin- pt choice. Last filled for 30 day supply 5/16  · Coreg- pt choice. Last filled for 30 day supply 5/16  · Digoxin- pt choice. Last filled for 30 day supply 2/45  · Folic Acid- pt choice. Last filled for 30 day supply 5/16  · Lidocaine- pt choice  · Lisinopril- pt choice. Last filled for 30 day supply 5/16  · MVI- pt choice. Last filled for 30 day supply 5/16  · Spironolactone- pt choice. Last filled for 30 day supply 5/16  · Vitamin B-1- pt choice. Last filled for 30 day supply 5/16    Other notes (ex. Recent course of antibiotics, Coumadin dosing):  · Junaid Ervin has never filled any Rx for patient  · Denies use of other OTC or herbal medications.       Allergies reviewed      Electronically signed by Nickie Brown, [facility] on 10/26/2017 at 10:00 AM

## 2017-10-26 NOTE — H&P
Halima Patel - History & Physical      PCP: Marisol Verdugo CNP    Date of Admission: 10/25/2017    Date of Service: Pt seen/examined on 10/25/2017 and Admitted to Inpatient with expected LOS greater than two midnights due to medical therapy. Chief Complaint:  Chest pain and shortness of breath    History Of Present Illness: The patient is a 60 y.o. male who presented to ED with almost a week of feeling unwell. Has history of ETOH use, significant cardiac disease but poorly compliant to medication. He claims he was told by a friend he wasn't looking good and the ambulance was called. Gives disjointed history and thinks symptoms of pleuritic chest pain, cough wheezing and shortness of breath was due to eating a bad chicken wing. Past Medical History:        Diagnosis Date    Brain tumor ([facility] [address]     COPD (chronic obstructive pulmonary disease) ([facility] [address]     Coronary artery disease involving native coronary artery of native heart without angina pectoris     Essential hypertension     S/P cardiac catheterization: 3/27/2017: 40-50% mid-LAD, 60-70% mid-Ramus with FFR of 0.9, and 50% mid-RCA. LVEF 25-30%. Severe AI 3+.  3/27/2017    3/27/2017: 40-50% mid-LAD, 60-70% mid-Ramus with FFR of 0.9, and 50% mid-RCA. LVEF 25-30%. Severe AI 3+. Radial approach. 100 cc contrast. Dr. Sen Pi       Past Surgical History:        Procedure Laterality Date    BRAIN SURGERY      removal of brain tumor    COLONOSCOPY         Home Medications:  Prior to Admission medications    Medication Sig Start Date End Date Taking? Authorizing Provider   Heating Pads (HEATING PAD DRY HEAT) PADS 1 Device by Does not apply route as needed (pain) 8/24/17   Conor St NP   lidocaine (XYLOCAINE) 5 % ointment Apply topically as needed.  8/24/17   Conor St NP   aspirin 325 MG EC tablet Take 1 tablet by mouth daily 5/13/17 5/13/18  Ema Lennox, MD   lisinopril (PRINIVIL;ZESTRIL) 5 MG tablet Take 1 tablet by mouth daily 3/28/17   Ashley Romano MD   carvedilol (COREG) 3.125 MG tablet Take 1 tablet by mouth 2 times daily (with meals) 3/28/17   Ashley Romano MD   digoxin (LANOXIN) 125 MCG tablet Take 1 tablet by mouth daily 3/28/17   Ashley Romano MD   spironolactone (ALDACTONE) 25 MG tablet Take 1 tablet by mouth daily 3/28/17   Ashley Romano MD   vitamin B-1 100 MG tablet Take 1 tablet by mouth daily 3/28/17   Ashley Romano MD   Multiple Vitamin (MULTIVITAMIN) tablet Take 1 tablet by mouth daily 3/28/17   Ashley Romano MD   folic acid (FOLVITE) 1 MG tablet Take 1 tablet by mouth daily 3/28/17   Ashley Romano MD       Allergies:    Review of patient's allergies indicates no known allergies. Social History:    The patient currently lives at [facility]  Tobacco:   reports that he has been smoking Cigarettes. He has been smoking about 2.00 packs per day. He has never used smokeless tobacco.  Alcohol:   reports that he drinks about 16.8 oz of alcohol per week . Illicit Drugs:     Family History:     Reviewed in detail and negative for DM, CAD, Cancer, CVA. Positive as follows:      Problem Relation Age of Onset    Cancer Mother     Cancer Father     Diabetes Brother        Review of Systems:   Review of systems not obtained due to patient factors. Physical Examination:  General Appearance:  awake, alert, oriented, in moderate respiratory distress  Skin:  Skin color, texture, turgor normal. No rashes or lesions. Head/face:  NCAT  Eyes:  No gross abnormalities. , PERRL and EOMI  Mouth/Throat:  Mucosa moist.  No lesions. Pharynx without erythema, edema or exudate. Neck:  neck- supple, no mass, non-tender and no bruits  Lungs:  Normal expansion. Clear to auscultation. Bilateral, rhonchi, or wheezing.,   Heart:  Heart regular rate and rhythm  Murmur(s)-  2/6 systolic at 2nd left intercostal space  Abdomen:  Soft, non-tender, normal bowel sounds. No bruits, organomegaly or masses.   Extremities: Extremities warm to touch, pink, with no edema. Neurologic:  negative    Diagnostic Data:  CXR:  I have reviewed the report with the following interpretation: Left mid and upper lung pneumonia  CTA  Acute thrombus in the bilateral pulmonary arteries extending into lobular branches as detailed above. 2. Left lower lobe pneumonia and trace left-sided pleural effusion. 3. Chronic lung disease. Enlarged main pulmonary artery may be secondary to pulmonary arterial hypertension. 4. Grade 4 L1 compression fracture of indeterminate age. 5. Nondisplaced fracture of the posterior 11th right rib, also of indeterminate age. EKG:  I have reviewed the EKG with the following interpretation: Sinus tachycardia  Possible Left atrial enlargement  LVH with repolarization abnormality  Abnormal ECG  LABS:   CBC:  Recent Labs      10/25/17   1858   WBC  19.1*   HGB  13.2*   HCT  38.7*   PLT  274     BMP:  Recent Labs      10/25/17   1858   NA  125*   K  4.6   CL  87*   CO2  25   BUN  11   CREATININE  0.6   CALCIUM  9.1     Recent Labs      10/25/17   1858   AST  23   ALT  15   BILIDIR  <0.2   BILITOT  0.6   ALKPHOS  74     Coag Panel:   Recent Labs      10/25/17   1858   INR  1.15*   APTT  30.6     Cardiac Enzymes: No results for input(s): Ova April in the last 72 hours. ABGs:No results found for: PHART, PO2ART, MMQ5ZIB  Urinalysis:  Lab Results   Component Value Date    NITRU NEGATIVE 03/23/2017    WBCUA NONE SEEN 03/23/2017    BACTERIA NONE 03/23/2017    RBCUA 0-2 03/23/2017    BLOODU TRACE 03/23/2017    GLUCOSEU NEGATIVE 03/23/2017       Assessment:  Active Hospital Problems    Diagnosis Date Noted    Pulmonary embolism ([facility] [address] [I26.99] 10/25/2017    Pneumonia [J18.9] 10/25/2017    Sepsis ([address] [A41.9] 10/25/2017    COPD exacerbation ([address] [J44.1] 10/25/2017   Hyponatremia    Plan:  1. Heparin drip  2. Azithromax/rocephin  3. Steroid/duoneb  4.  Continue cardiac medication    DVT Prophylaxis: on anticoagulation  Diet:  Cardiac  Code Status: Prior  GI Prophylaxis: PPI  PT/OT Eval Status: No  Dispo - adult care center      Radha Laguna MD  78/02/330720:15 PM

## 2017-10-26 NOTE — CONSULTS
[facility]  Cardiology Consultation      Family Physician:  Kai Blancas CNP  Cardiologist:  None    CHIEF COMPLAINT:  Chest pain    HISTORY OF PRESENT ILLNESS:      The patient is a 60 y.o. male patient who is known to us with h/o AI and cardiomyopathy. He is non-compliant. He has no significant CAD. He had been referred for AVR but he refused it. He did not show up in the CHF clinic. Acording to ER note, Minerva Oconnell is a 60 y.o. male with a past medical history of COPD, CAD, and hypertension who presents to the ED via EMS for the evaluation of chest pain. The patient reports that his chest pain began two days ago after eating a \"bad chicken wing\" from a restaurant and has gradually worsened with time. The pain is located in his left lower chest with radiation into his back. His pain is described as constant aching that he currently rates as a 6/10 in severity without modifying factors; denies any pain with inspiration. Patient also complains of shortness of breath and an intermittent cough he describes as being productive. He denies any fever, chills, fatigue, abdominal pain, nausea, vomiting, or changes in urination. The patient appears to be a poor historian. Patient is currently residing at [facility]. No further complaints at initial time of encounter     Previous cardiac testing:     Coronary angiography, date 3/2017:  Left main coronary artery is a 6 mm caliber vessel. It produces the LAD, the left circumflex artery and a ramus  intermedius branch. It shows moderate calcifications in the distal segment with no significant obstructive lesions. The LAD is a 3.5 mm caliber vessel that courses along the anterior intraventricular groove all the way to the apex and  wraps around it . It shows diffuse moderate atherosclerotic disease with maximum stenosis in the range of 40-50% in  the mid-segment.   The left circumflex artery is a 4 mm , co-dominant vessel BRAIN SURGERY      removal of brain tumor    COLONOSCOPY         Medications Prior to Admission:    Prescriptions Prior to Admission: [DISCONTINUED] Heating Pads (HEATING PAD DRY HEAT) PADS, 1 Device by Does not apply route as needed (pain)  [DISCONTINUED] lidocaine (XYLOCAINE) 5 % ointment, Apply topically as needed. [DISCONTINUED] aspirin 325 MG EC tablet, Take 1 tablet by mouth daily  [DISCONTINUED] lisinopril (PRINIVIL;ZESTRIL) 5 MG tablet, Take 1 tablet by mouth daily  [DISCONTINUED] carvedilol (COREG) 3.125 MG tablet, Take 1 tablet by mouth 2 times daily (with meals)  [DISCONTINUED] digoxin (LANOXIN) 125 MCG tablet, Take 1 tablet by mouth daily  [DISCONTINUED] spironolactone (ALDACTONE) 25 MG tablet, Take 1 tablet by mouth daily  [DISCONTINUED] vitamin B-1 100 MG tablet, Take 1 tablet by mouth daily  [DISCONTINUED] Multiple Vitamin (MULTIVITAMIN) tablet, Take 1 tablet by mouth daily  [DISCONTINUED] folic acid (FOLVITE) 1 MG tablet, Take 1 tablet by mouth daily    Allergies:    Review of patient's allergies indicates no known allergies. Social History:    reports that he has been smoking Cigarettes. He has been smoking about 2.00 packs per day. He has never used smokeless tobacco. He reports that he drinks about 16.8 oz of alcohol per week . He reports that he does not use drugs. Family History:   family history includes Cancer in his father and mother; Diabetes in his brother.     REVIEW OF SYSTEMS:  As above in the HPI, otherwise negative    PHYSICAL EXAM:    Vitals:  BP (!) 106/58   Pulse 76   Temp 97.3 °F (36.3 °C) (Oral)   Resp 16   Ht 5' 4\" (1.626 m)   Wt 140 lb 9.6 oz (63.8 kg)   SpO2 91%   BMI 24.13 kg/m²     General Appearance: Alert and responsive, well developed and well- nourished, in no form of acute distress  Head: normocephalic and atraumatic  Eyes: pupils equal, round, and reactive to light, extraocular eye movements intact, conjunctivae normal  Neck: supple and non-tender without

## 2017-10-26 NOTE — PROGRESS NOTES
Pt arrived in 4k 14 from ED and via cart/stretcher. Complaints: Chest pain / discomfort. IV normal saline infusing into the antecubital left, condition patent and no redness at a rate of 100 mls/ hour with about 500 mls remaining in the bag. Patient assessment and vital signs completed. Patient was a poor historian. Unable to complete medications because patient says he doesn't take any medications.

## 2017-10-26 NOTE — PLAN OF CARE
Problem: Impaired respiratory status  Goal: Clear lung sounds  Outcome: Ongoing  Duoneb started Q4 w/a to improve breath sounds.

## 2017-10-26 NOTE — FLOWSHEET NOTE
10/26/17 0047   Provider Notification   Reason for Communication Evaluate  (new consult)   Provider Name Julio Moya   Provider Notification Nurse Practitioner   Method of Communication Secure Message   Response Waiting for response   Notification Time [phone]   0100: Malinda Luu read perfect serve message but never responded.

## 2017-10-26 NOTE — ED NOTES
Pt resting quietly in room no needs expressed. Side rails up x2 with call light in reach. Will continue to monitor.        Fredrica Hamman, RN  10/25/17 3652

## 2017-10-26 NOTE — FLOWSHEET NOTE
10/26/17 1159   Encounter Summary   Services provided to: Patient   Referral/Consult From: Rounding   Continue Visiting Yes  (10/26 AD info)   Complexity of Encounter Low   Length of Encounter 15 minutes   Routine   Type Initial   Assessment Approachable   Intervention Nurtured hope   Outcome Coping   Spiritual/Rastafari   Type Spiritual support   Advance Directives (For Healthcare)   Healthcare Directive No, patient does not have an advance directive for healthcare treatment   [facility] No   Patient Requests Assistance Yes, referral made to    Advance Directives Unable to complete; Documents given;Documents explained   Advance Directive Consult: Advance Directive materials were provided to patient and explained. Patient is not ready to complete at this time, gave information for Spiritual Care to be contacted if further assistance is needed.

## 2017-10-26 NOTE — CONSULTS
[facility]    Patient - Chadd Romero   MRN -  [id number]   [facility]   - 1955      Date of Admission -  10/25/2017  6:04 PM  Date of evaluation -  10/26/2017  Room - [address] [place] [address], DO Primary Care Physician - Yazmin Rg CNP   Chief Complaint/Reason for Consult   Shortness of Breath/Pneumonia,PE   Active Hospital Problem List      Active Hospital Problems    Diagnosis Date Noted    Pulmonary embolism ([facility] [I26.99] 10/25/2017    Pneumonia [J18.9] 10/25/2017    Sepsis ([facility] [A41.9] 10/25/2017    COPD exacerbation [facility] [J44.1] 10/25/2017     HPI   Chadd Romero is a 60 y.o. male admitted for chest pain and shortness of breath. 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 Patient reports that he has had persistent left chest pain that started on 10/23/17 after eating \"bad chicken wing\". He describes the pain as worsening over time as a constant ache. Patient is currently residing at [facility]. Currently smokes 2 PPD for past 50 years. Patient denies having been treated for breathing issues in the past, denies taking any inhalers, or following with any pulmonologist in the past.   Past Medical History         Diagnosis Date    Brain tumor ([facility]     COPD (chronic obstructive pulmonary disease) ([facility]     Coronary artery disease involving native coronary artery of native heart without angina pectoris     Essential hypertension     S/P cardiac catheterization: 3/27/2017: 40-50% mid-LAD, 60-70% mid-Ramus with FFR of 0.9, and 50% mid-RCA. LVEF 25-30%. Severe AI 3+.  3/27/2017    3/27/2017: 40-50% mid-LAD, 60-70% mid-Ramus with FFR of 0.9, and 50% mid-RCA. LVEF 25-30%. Severe AI 3+. Radial approach.  100 cc contrast. Dr. Ashok Vazquez      Past Surgical History           Procedure Laterality prelim growth  MRAS- Sent  VRE-Sent  Rapid Flu A/B-Negative  Sputum Gram stain-ordered  Radiology    CXR    10/25/2017   Left mid and upper lung pneumonia. CT Scans    CTA CHEST W WO CONTRAST  10/25/2017   1. Acute thrombus in the bilateral pulmonary arteries extending into lobular branches as detailed above. 2. Left lower lobe pneumonia and trace left-sided pleural effusion. 3. Chronic lung disease. Enlarged main pulmonary artery may be secondary to pulmonary arterial hypertension. 4. Grade 4 L1 compression fracture of indeterminate age. 5. Nondisplaced fracture of the posterior 11th right rib, also of indeterminate age. (See actual reports for details)    Assessment   Bilateral Pulmonary Embolism  Community acquired pneumonia- TMAX 101.6, procal 1.31  Nicotine dependence  CAD  HTN  Brain tumor s/p removal 1958  Past Hx of ETOH abuse- Serum Ethyl alcohol <0.01 on admission  Recommendations   -Continue Antibiotics zithromax and rocephin  -Continue IV solumedrol 40 mg  -NRT  -Continue Duonebs Q4 hrs WA  -Follow ECHO  -Ordered Bilateral LE Venous Duplex  -Ordered Strep pneumoniae and Legionella antigen  -Ordered Respiratory culture  -Continue heparin GTT managed per primary  -Tobacco cessation education  -Titrate supplemental O2 to maintain SpO2 >92%    Thank you for the consult and allowing us to participate in the care of your patient. Case discussed with nurse and patient/family. Questions and concerns addressed. Meds and Orders reviewed. Electronically signed by   Vel Root CNP on 10/26/2017 at 9:49 AM    Addendum by Dr. Alexye Garcia MD:  I have seen and examined the patient independently. Face to face evaluation and examination was performed. The above evaluation and note has been reviewed. Labs and radiographs were reviewed. I Have discussed with Mr. Gwen Junior CNP about this patient in detail. D/w Patient's R.N. and specific instructions given. Patient issues addressed.

## 2017-10-26 NOTE — PLAN OF CARE
Problem: Impaired respiratory status  Goal: Clear lung sounds  Outcome: Ongoing  Treatment will be continued as ordered to improve aeration. Patient remains on room air.

## 2017-10-26 NOTE — PLAN OF CARE
Problem: Falls - Risk of  Goal: Absence of falls  Outcome: Met This Shift  Free of falls this shift  UP with assistance  Gait unsteady at times    Problem: Risk for Impaired Skin Integrity  Goal: Tissue integrity - skin and mucous membranes  Structural intactness and normal physiological function of skin and  mucous membranes. Outcome: Met This Shift  Skin intact in all areas      Problem: Pain Control  Goal: Maintain pain level at or below patient's acceptable level (or 5 if patient is unable to determine acceptable level)  Outcome: Met This Shift  Patient denies pain so far this shift     Problem: Neurological  Goal: Maximum potential motor/sensory/cognitive function  Outcome: Met This Shift  Patient alert & oriented x 4  Speech clear & appropriate     Problem: Cardiovascular  Goal: No DVT, peripheral vascular complications  All pulses palpable   On Heparin drip  + bilateral PE's     Problem: Respiratory  Goal: No pulmonary complications  Outcome: Met This Shift  Patient + bilateral PE's  Pulmonary consulted and saw pt  Pt on heparin drip  Weaning off heparin drip this shft  Goal: O2 Sat > 90%  Outcome: Met This Shift  02 above 90% on 0.5L NC  Pt being weaned to RA this shift    Problem: Skin Integrity/Risk  Goal: No skin breakdown during hospitalization  Outcome: Met This Shift  Skin intact, redness to buttocks &  Bilateral heels blanches     Problem: Discharge Planning:  Goal: Patients continuum of care needs are met  Patients continuum of care needs are met   Outcome: Met This Shift   on case to assist with pt discharge. Plans to [facility] [place]     Comments: Care plan reviewed with patient. Patient verbalizes understanding of the plan of care and contributes to goal setting.

## 2017-10-26 NOTE — PROGRESS NOTES
Hospitalist Progress Note    Patient:  Brayan Muñoz      Unit/Bed:4K-14/014-A    YOB: 1955    MRN: [id number]       Acct: [de-identified]     PCP: Ritu Caballero CNP    Date of Admission: 10/25/2017    Chief Complaint: Chest pain, SOB    Hospital Course: Admitted after finding PE, sepsis with likely pneumonia     Subjective: 10/26/17 - Feels good, denies any symptoms asked of him and is asking about going home       Medications:  Reviewed    Infusion Medications    heparin (porcine) 21 Units/kg/hr (10/26/17 1352)     Scheduled Medications    [START ON 10/27/2017] influenza virus vaccine  0.5 mL Intramuscular Once    potassium (CARDIAC) replacement protocol   Other RX Placeholder    magnesium replacement protocol   Other RX Placeholder    [START ON 10/27/2017] methylPREDNISolone  40 mg Intravenous Daily    potassium chloride  20 mEq Oral BID WC    magnesium sulfate  2 g Intravenous Once    doxycycline hyclate  100 mg Oral 2 times per day    cefTRIAXone (ROCEPHIN) IV  1 g Intravenous Q24H    lisinopril  5 mg Oral Daily    carvedilol  3.125 mg Oral BID WC    digoxin  125 mcg Oral Daily    thiamine  100 mg Oral Daily    multivitamin  1 tablet Oral Daily    folic acid  1 mg Oral Daily    aspirin  325 mg Oral Daily    sodium chloride flush  10 mL Intravenous 2 times per day    docusate sodium  100 mg Oral BID    nicotine  1 patch Transdermal Daily    ipratropium-albuterol  1 ampule Inhalation Q4H WA     PRN Meds: heparin (porcine), heparin (porcine), sodium chloride flush, acetaminophen, ondansetron      Intake/Output Summary (Last 24 hours) at 10/26/17 1837  Last data filed at 10/26/17 1402   Gross per 24 hour   Intake          2490.27 ml   Output              450 ml   Net          2040.27 ml       Diet:  DIET CARDIAC;     Exam:  BP (!) 94/46   Pulse 66   Temp 97.9 °F (36.6 °C) (Oral)   Resp 16   Ht 5' 4\" (1.626 m)   Wt 140 lb 9.6 oz (63.8 kg)   SpO2 97%   BMI 24.13 kg/m² been created using voice recognition software. It may contain minor errors which are inherent in voice recognition technology. **      Final report electronically signed by Dr. Michela Puri on 10/26/2017 5:21 PM      CTA Chest W WO Contrast   Final Result   Addendum 1 of 1   ** ADDENDUM #1 **      The reported L1 compression fracture is actually a compression fracture of    T12. Final report electronically signed by Dr. Quentin Ortiz on 10/25/2017    8:56 PM      ** ORIGINAL REPORT **   PROCEDURE: CTA CHEST W WO CONTRAST      CLINICAL INFORMATION: Chest pain      TECHNIQUE: CTA of the chest was performed following administration of 80    mL Isovue-370 intravenous contrast. Axial images as well as coronal and    sagittal reconstructions were obtained. All CT scans at this facility use dose modulation, iterative    reconstruction, and/or weight-based dosing when appropriate to reduce    radiation dose to as low as reasonably achievable. COMPARISON: None      FINDINGS: There is acute thrombus extending from the right pulmonary    artery into right middle and lower lobe arterial branches. Acute thrombus    is also present in the left pulmonary artery and extends into left lower    lobe branches. The main pulmonary    artery measures 3.6 cm in diameter. Cardiac size is at the upper limits of normal. There is no evidence of    right heart strain. Atherosclerotic calcifications are seen in the    thoracic aorta and coronary arteries without evidence of aneurysm. There    is no pericardial effusion. There is trace    pleural fluid at the left lung base. Alveolar and reticular opacities are    present in the left lower lobe. Emphysematous changes are noted in the    bilateral lungs. Prominent subcarinal lymph nodes are not pathologically    enlarged by CT criteria and may be    reactive. There is no hilar or axillary lymphadenopathy.  Degenerative    changes are present in the thoracic spine without CARDIAC;    DVT prophylaxis: [] Lovenox                                 [] SCDs                                 [] SQ Heparin                                 [] Encourage ambulation           [x] Already on Anticoagulation     Disposition:    [x] Home       [] TCU       [] Rehab       [] Psych       [] SNF       [] [place]       [] Other-    Code Status: Full Code    PT/OT Eval Status: no    Assessment/Plan:    Anticipated Discharge in : 1-3 days    Active Hospital Problems    Diagnosis Date Noted    [facility] [I26.99] 10/25/2017    Pneumonia due to organism [J18.9] 10/25/2017    Sepsis ([facility] [A41.9] 10/25/2017    COPD exacerbation [facility] [J44.1] 10/25/2017       PE  · On heparin gtt, will see about switch to NOAC since echo doesn't reveal R heart strain and no LE clots so no obvious indication for thrombectomy    Sepsis, pneumonia  ·  Continue antibiotics and follow cultures    Aortic insufficiency  ·  Patient refuses surgery        Electronically signed by Ari Garay DO on 10/26/2017 at 6:37 PM

## 2017-10-27 LAB
APTT: 48 SECONDS (ref 22–38)
APTT: 54.5 SECONDS (ref 22–38)
APTT: 65.2 SECONDS (ref 22–38)
EKG ATRIAL RATE: 84 BPM
EKG P AXIS: 52 DEGREES
EKG P-R INTERVAL: 138 MS
EKG Q-T INTERVAL: 432 MS
EKG QRS DURATION: 100 MS
EKG QTC CALCULATION (BAZETT): 510 MS
EKG R AXIS: 49 DEGREES
EKG T AXIS: -164 DEGREES
EKG VENTRICULAR RATE: 84 BPM
MAGNESIUM: 2.9 MG/DL (ref 1.6–2.4)
PLATELET # BLD: 260 THOU/MM3 (ref 130–400)
POTASSIUM SERPL-SCNC: 3.9 MEQ/L (ref 3.5–5.2)

## 2017-10-27 PROCEDURE — 6360000002 HC RX W HCPCS: Performed by: INTERNAL MEDICINE

## 2017-10-27 PROCEDURE — 6370000000 HC RX 637 (ALT 250 FOR IP): Performed by: INTERNAL MEDICINE

## 2017-10-27 PROCEDURE — 2060000000 HC ICU INTERMEDIATE R&B

## 2017-10-27 PROCEDURE — 85049 AUTOMATED PLATELET COUNT: CPT

## 2017-10-27 PROCEDURE — 6360000002 HC RX W HCPCS: Performed by: FAMILY MEDICINE

## 2017-10-27 PROCEDURE — 6370000000 HC RX 637 (ALT 250 FOR IP): Performed by: NURSE PRACTITIONER

## 2017-10-27 PROCEDURE — 84132 ASSAY OF SERUM POTASSIUM: CPT

## 2017-10-27 PROCEDURE — 94640 AIRWAY INHALATION TREATMENT: CPT

## 2017-10-27 PROCEDURE — 99232 SBSQ HOSP IP/OBS MODERATE 35: CPT | Performed by: INTERNAL MEDICINE

## 2017-10-27 PROCEDURE — 2580000003 HC RX 258: Performed by: INTERNAL MEDICINE

## 2017-10-27 PROCEDURE — 85730 THROMBOPLASTIN TIME PARTIAL: CPT

## 2017-10-27 PROCEDURE — 83735 ASSAY OF MAGNESIUM: CPT

## 2017-10-27 PROCEDURE — 36415 COLL VENOUS BLD VENIPUNCTURE: CPT

## 2017-10-27 PROCEDURE — 99233 SBSQ HOSP IP/OBS HIGH 50: CPT | Performed by: INTERNAL MEDICINE

## 2017-10-27 RX ORDER — PREDNISONE 20 MG/1
40 TABLET ORAL DAILY
Status: DISCONTINUED | OUTPATIENT
Start: 2017-10-28 | End: 2017-10-30 | Stop reason: HOSPADM

## 2017-10-27 RX ORDER — POTASSIUM CHLORIDE 750 MG/1
20 TABLET, FILM COATED, EXTENDED RELEASE ORAL ONCE
Status: COMPLETED | OUTPATIENT
Start: 2017-10-27 | End: 2017-10-27

## 2017-10-27 RX ADMIN — DOXYCYCLINE HYCLATE 100 MG: 100 TABLET, COATED ORAL at 08:50

## 2017-10-27 RX ADMIN — POTASSIUM CHLORIDE 20 MEQ: 1500 TABLET, EXTENDED RELEASE ORAL at 08:50

## 2017-10-27 RX ADMIN — FOLIC ACID 1 MG: 1 TABLET ORAL at 08:50

## 2017-10-27 RX ADMIN — WATER 1 G: 1 INJECTION INTRAMUSCULAR; INTRAVENOUS; SUBCUTANEOUS at 22:29

## 2017-10-27 RX ADMIN — CARVEDILOL 3.12 MG: 3.12 TABLET, FILM COATED ORAL at 16:24

## 2017-10-27 RX ADMIN — CARVEDILOL 3.12 MG: 3.12 TABLET, FILM COATED ORAL at 07:56

## 2017-10-27 RX ADMIN — HEPARIN SODIUM AND DEXTROSE 25 UNITS/KG/HR: 10000; 5 INJECTION INTRAVENOUS at 07:39

## 2017-10-27 RX ADMIN — Medication 100 MG: at 08:50

## 2017-10-27 RX ADMIN — DOCUSATE SODIUM 100 MG: 100 CAPSULE ORAL at 20:36

## 2017-10-27 RX ADMIN — LISINOPRIL 5 MG: 5 TABLET ORAL at 08:50

## 2017-10-27 RX ADMIN — ASPIRIN 325 MG: 325 TABLET, DELAYED RELEASE ORAL at 08:50

## 2017-10-27 RX ADMIN — POTASSIUM CHLORIDE 20 MEQ: 1500 TABLET, EXTENDED RELEASE ORAL at 16:25

## 2017-10-27 RX ADMIN — POTASSIUM CHLORIDE 20 MEQ: 1500 TABLET, EXTENDED RELEASE ORAL at 08:52

## 2017-10-27 RX ADMIN — IPRATROPIUM BROMIDE AND ALBUTEROL SULFATE 1 AMPULE: .5; 3 SOLUTION RESPIRATORY (INHALATION) at 20:38

## 2017-10-27 RX ADMIN — DOXYCYCLINE HYCLATE 100 MG: 100 TABLET, COATED ORAL at 20:36

## 2017-10-27 RX ADMIN — METHYLPREDNISOLONE SODIUM SUCCINATE 40 MG: 40 INJECTION, POWDER, FOR SOLUTION INTRAMUSCULAR; INTRAVENOUS at 08:54

## 2017-10-27 RX ADMIN — Medication 10 ML: at 08:54

## 2017-10-27 RX ADMIN — IPRATROPIUM BROMIDE AND ALBUTEROL SULFATE 1 AMPULE: .5; 3 SOLUTION RESPIRATORY (INHALATION) at 16:47

## 2017-10-27 RX ADMIN — THERA TABS 1 TABLET: TAB at 08:50

## 2017-10-27 RX ADMIN — IPRATROPIUM BROMIDE AND ALBUTEROL SULFATE 1 AMPULE: .5; 3 SOLUTION RESPIRATORY (INHALATION) at 08:40

## 2017-10-27 RX ADMIN — RIVAROXABAN 15 MG: 15 TABLET, FILM COATED ORAL at 16:25

## 2017-10-27 RX ADMIN — POTASSIUM CHLORIDE 20 MEQ: 750 TABLET, FILM COATED, EXTENDED RELEASE ORAL at 08:52

## 2017-10-27 RX ADMIN — HEPARIN SODIUM 2900 UNITS: 1000 INJECTION, SOLUTION INTRAVENOUS; SUBCUTANEOUS at 09:36

## 2017-10-27 RX ADMIN — Medication 10 ML: at 20:36

## 2017-10-27 RX ADMIN — DIGOXIN 125 MCG: 0.12 TABLET ORAL at 08:50

## 2017-10-27 ASSESSMENT — PAIN SCALES - GENERAL: PAINLEVEL_OUTOF10: 0

## 2017-10-27 NOTE — PROGRESS NOTES
Musculoskeletal: Moves all extremities   Neurological: Patient is alert and oriented to person, place, and time. Skin: Warm and dry. Labs   ABG  Lab Results   Component Value Date    PH 7.47 10/25/2017    PO2 61 10/25/2017    PCO2 35 10/25/2017    HCO3 25 10/25/2017    O2SAT 93 10/25/2017     Lab Results   Component Value Date    IFIO2 2 10/25/2017     CBC  Recent Labs      10/25/17   1858  10/26/17   0351  10/27/17   0204   WBC  19.1*  14.1*   --    RBC  4.14*  4.00*   --    HGB  13.2*  12.6*   --    HCT  38.7*  37.5*   --    MCV  93.6  93.8   --    MCH  31.8*  31.5*   --    MCHC  34.0  33.5   --    RDW  15.5*  15.7*   --    PLT  274  270  260   MPV  8.5  8.9   --       BMP  Recent Labs      10/25/17   1858  10/26/17   0351  10/26/17   1553  10/27/17   0204   NA  125*  128*  132*   --    K  4.6  3.6   --   3.9   CL  87*  93*   --    --    CO2  25  24   --    --    BUN  11  10   --    --    CREATININE  0.6  0.5   --    --    GLUCOSE  99  83   --    --    MG  1.9  1.8   --   2.9*   CALCIUM  9.1  8.2*   --    --      LFT  Recent Labs      10/25/17   1858   AST  23   ALT  15   BILITOT  0.6   ALKPHOS  74   LIPASE  21.8     TROP  Lab Results   Component Value Date    TROPONINT < 0.010 10/25/2017    TROPONINT < 0.010 05/12/2017     BNP  Lab Results   Component Value Date    PROBNP 08448.0 10/25/2017    PROBNP 1821.0 05/12/2017     D-Dimer  No results found for: DDIMER  Lactic Acid  Recent Labs      10/25/17   2308   LACTA  1.0     INR  Recent Labs      10/25/17   1858   INR  1.15*     PTT  Recent Labs      10/26/17   1938  10/27/17   0204  10/27/17   0833   APTT  52.4*  54.5*  48.0*     Glucose  No results for input(s): POCGLU in the last 72 hours. UA   Recent Labs      10/26/17   0430   PHUR  6.0   COLORU  DK YELLOW*   PROTEINU  30*   BLOODU  SMALL*   RBCUA  5-10   WBCUA  0-2   BACTERIA  NONE   NITRU  NEGATIVE   GLUCOSEU  NEGATIVE   BILIRUBINUR  NEGATIVE   UROBILINOGEN  1.0   KETUA  NEGATIVE   .   PFTs to digoxin interaction  -Change IV solumedrol 40 mg to PO 40 mg Daily  -NRT  -Continue Duonebs Q4 hrs WA  -Follow cultures  -Discussed with Dr. Ta Mayer given patient insurance and compliance in the past recommend transitioning to Xarelto 15 Mg PO BID for 21 days on day 22 transition to 20 mg PO DAILY continue treatment for a total of 3 months  -Discontinue Heparin GTT after patient receives 1st dose of Xarelto   -Orders placed for Xarelto on above mentioned scheduled  -Tobacco cessation education  -Titrate supplemental O2 to maintain SpO2 >92%      Case discussed with nurse and patient. Questions and concerns addressed. Meds and Orders reviewed. Electronically signed by   Colt Lei CNP on 10/27/2017 at 11:39 AM    Addendum by Dr. Beatrice Phelan MD:  I have seen and examined the patient independently. Face to face evaluation and examination was performed. The above evaluation and note has been reviewed. Labs and radiographs were reviewed. I Have discussed with Mr. Nabeel Hanks CNP about this patient in detail. D/w Patient's R.N. and specific instructions given. Patient issues addressed. The above assessment and plan has been reviewed. Please see my modifications mentioned below. My modifications:  More awake and alert. Patient needs anticoagulation for at least 3months. Continue antibiotics for his pneumonia.  -Follow with Mr. Nabeel Hanks CNP or my ( Dr. Shannan Davalos) clinic at [place] for pulmonary medicine in 2 to 3months with chest Xray PA and lateral views 2days before clinic visit to follow pneumonia with full PFTS before clinic visit.     Cora Roper MD 10/27/2017 3:11 PM

## 2017-10-27 NOTE — PROGRESS NOTES
Patient refuses to turn as recommended. Education provided regarding the benefits of repositioning and the risk to skin integrity associated with not repositioning as recommended.

## 2017-10-27 NOTE — PROGRESS NOTES
Date of Service: 03/05/2017    CHIEF COMPLAINT:  Follow the patient for headache, pulmonary embolus, some chest pain on deep breaths.     HISTORY OF PRESENT ILLNESS:  The patient is complaining of some chest pain when she tries to take a deep breath, was still having some headache.  Denies any nausea, vomiting, no fever or chills.    PAST MEDICAL HISTORY, FAMILY HISTORY, SOCIAL HISTORY, MEDICATIONS AND ALLERGIES:  Reviewed.  Past Medical History:   Diagnosis Date   • Acute bronchitis     PT. REPORTED WITH THE ONSET OF A COLD   • Blood clot associated with vein wall inflammation    • Esophageal reflux     GERD   • Fracture     Left 5th MC   • Migraine with aura, without mention of intractable migraine without mention of status migrainosus 8/25/2011   • NO HX OF     CA, HTN, DM, CAD, CVA, DVT, Asthma   • Other pulmonary embolism and infarction 6/05    ?after use of OCP   • Other specified gastritis without mention of hemorrhage 9/13/07    H Pylori negative   • Phlebitis and thrombophlebitis of other deep vessels of lower extremities 6/05    ?after use of OCP   • Unspecified symptom associated with female genital organs     Pelvic pain       Past Surgical History:   Procedure Laterality Date   • ARTHROSCOPY SHOULDER,DIST CLAV  4/27/09    L clavicle resection & biceps tenotomy by Dr. MELL Guzmán   • CARPAL TUNNEL RELEASE  96, 97    R [facility]   • ESOPHAGOGASTRODUODENOSCOPY TRANSORAL FLEX W/BX SINGLE OR MULT  9/13/07    Dr. Ruff   • FOREARM/WRIST SURGERY UNLISTED  1996    Forearm/Wrist carpal tunnel and ganglion cyst' L   • HYSTERECTOMY     • INTRA CONTR DEV, MIRENA  5/29/12   • KNEE SCOPE,DIAGNOSTIC  10/29/14    left knee scope with lateral meniscectomy by Dr. KALEB Guzmán   • LIGATE FALLOPIAN TUBE  1994    Tubal Ligation   • PARAVERTEBRAL FACET INJ JNT LUMBAR SACRAL 1  5/7/2013    2nd RIGHT LMBB   • PARAVERTEBRAL FACET INJ JNT LUMBAR SACRAL 1  5/20/2013    RIGHT LRFA   • PARAVERTEBRAL FACET INJ JNT LUMBAR SACRAL 1  8/19/13     550 ml   Net          1352.03 ml       Diet:  DIET CARDIAC; Exam:  /61   Pulse 78   Temp 97 °F (36.1 °C) (Oral)   Resp 16   Ht 5' 4\" (1.626 m)   Wt 138 lb 3 oz (62.7 kg)   SpO2 94%   BMI 23.72 kg/m²     General appearance: No apparent distress, appears stated age and cooperative. HEENT: Pupils equal, round, and reactive to light. Conjunctivae/corneas clear. Neck: Supple, with full range of motion. No jugular venous distention. Trachea midline. Respiratory:  Normal respiratory effort. Clear to auscultation, bilaterally without Rales/Wheezes/Rhonchi. Cardiovascular: Regular rate and rhythm with normal S1/S2 without  rubs or gallops. Loud 4-5/6 murmur noted, auscultated over entire chest, louder than bowel sounds. Abdomen: Soft, non-tender, non-distended with normal bowel sounds. Musculoskeletal: No clubbing, cyanosis or edema bilaterally. Full range of motion without deformity. Skin: Skin color, texture, turgor normal.  No rashes or lesions. Neurologic:  Neurovascularly intact without any focal sensory/motor deficits.  Cranial nerves: II-XII intact, grossly non-focal.  Psychiatric: Alert and oriented, thought content appropriate, normal insight  Capillary Refill: Brisk,< 3 seconds   Peripheral Pulses: +2 palpable, equal bilaterally       Labs:   Recent Labs      10/25/17   1858  10/26/17   0351  10/27/17   0204   WBC  19.1*  14.1*   --    HGB  13.2*  12.6*   --    HCT  38.7*  37.5*   --    PLT  274  270  260     Recent Labs      10/25/17   1858  10/26/17   0351  10/26/17   1553  10/27/17   0204   NA  125*  128*  132*   --    K  4.6  3.6   --   3.9   CL  87*  93*   --    --    CO2  25  24   --    --    BUN  11  10   --    --    CREATININE  0.6  0.5   --    --    CALCIUM  9.1  8.2*   --    --      Recent Labs      10/25/17   1858   AST  23   ALT  15   BILIDIR  <0.2   BILITOT  0.6   ALKPHOS  74     Recent Labs      10/25/17   1858   INR  1.15*     No results for input(s): [place], right SLBB   • SHLDR ARTHROSCOP,PART ACROMIOPLAS  4-9-08    R shoulder scope with subacromial decompression by Dr. ROTH   • SHLDR ARTHROSCOP,PART ACROMIOPLAS  4/27/09    L shoulder scope with SAD by Dr. MELL Guzmán   • SLING OPER STRES INCONTINENCE  03/25/2003    Dr. Garett Magaña   TVT, currently eroding       SOCIAL HISTORY:  Social History   Substance Use Topics   • Smoking status: Never Smoker   • Smokeless tobacco: Never Used   • Alcohol use No       Family History:  Family History   Problem Relation Age of Onset   • Diabetes Mother      insulin dependent   • Hypertension Mother    • Ophthalmology Mother      Glaucoma   • Hypertension Father    • Diabetes Father    • Heart Father 65     coronary artery disease and heart catheter at the age of 62   • OTHER Maternal Aunt      possible fibrocystic disease   • Diabetes Maternal Grandmother      insulin dependent   • Ophthalmology Maternal Grandmother      Glaucoma   • Ophthalmology Maternal Grandfather      Glaucoma   • Ophthalmology Maternal Aunt      Glaucoma   • NEGATIVE FAMILY HX OF Other      no br ut ov or colon cancer       Prescriptions Prior to Admission   Medication Sig Dispense Refill   • gabapentin (NEURONTIN) 300 MG capsule Take 1 capsule by mouth 3 times daily. 90 capsule 5   • rivaroxaban (XARELTO) 15 MG Tab Take 1 tablet by mouth 2 times daily (with meals). And after 21 days go to the 20 mg daily 42 tablet 0   • meloxicam (MOBIC) 15 MG tablet Take 1 tablet by mouth daily. 30 tablet 2   • rivaroxaban (XARELTO) 20 MG Tab Take 1 tablet by mouth daily (with dinner). 30 tablet 5   • omeprazole (PRILOSEC) 40 MG capsule Take 1 capsule by mouth daily. 90 capsule 1   • HYDROcodone-acetaminophen (NORCO) 5-325 MG per tablet Take 1 tablet by mouth every 6 hours as needed for Pain. 30 tablet 0       ALLERGIES:   Allergen Reactions   • Propoxyphene PRURITUS     itching   • Vicodin [Hydrocodone-Acetaminophen] PRURITUS     All over body itching       Imaging:  Ct Head  Brain    Result Date: 3/4/2017  CT HEAD WO CONTRAST INDICATION: HEADACHE, CHRONIC, NEW FEATURES OR NEURO DEFICIT. COMPARISON: None. FINDINGS: No intracranial hemorrhage is seen. There is no mass effect or midline shift. The ventricular system appears intact. No abnormal extraaxial or intraaxial fluid collections are seen. The bony calvarium appears intact.     IMPRESSION: Unremarkable CT of the head.       PHYSICAL EXAMINATION:  VITAL SIGNS:  The patient's blood pressure 120/80, heart rate of 77, respiratory 20, temperature 98.1, saturating 99%.  General - Patient is in no acute distress. Patient is conversing freely in full sentences.   HEENT - Pupils equally round and reactive to light. Sclerae are anicteric.    Neck is soft and supple. No mass or thyroid enlargement.  CV - Regular rate and rhythm without additional heart sounds. No thrills.  Pulm - Lungs are clear to auscultation bilaterally. No wheezes, rales or rhonchi. No use of accessory muscles.  Abd - Soft, non-tender and non-distended. Bowel sounds are normoactive. No guarding or rebound tenderness. No Hepatosplenomegaly.  Ext - Warm and well perfused. No cyanosis or edema.   Skin- No rashes or lesions.   Neuro - Alert and orient to person, place and time. CNs II-XII are intact. Strength, sensation, and coordination are grossly intact.       LABORATORY DATA:    Reviewed in Epic.    ASSESSMENT AND PLAN:  The patient is a 44-year-old female with problem list of:  1.  Frontal headache.  The patient was having headache.  A CT scan of head was negative.  Plan for symptomatic relief with pain medication and antiemetic, monitor clinically.  The patient was continued on Gabapentin at her home dose for migraine headaches.    2.  Recently diagnosed pulmonary embolus.  The patient is on Xarelto, continue with her current treatment.    3.  She was having some severe pain on deep inspiration and will plan for Toradol p.r.n.  4.  Sob dyspnea, chest pain, likely with  TROPONINI in the last 72 hours. Urinalysis:    Lab Results   Component Value Date    NITRU NEGATIVE 10/26/2017    WBCUA 0-2 10/26/2017    BACTERIA NONE 10/26/2017    RBCUA 5-10 10/26/2017    BLOODU SMALL 10/26/2017    GLUCOSEU NEGATIVE 10/26/2017       Radiology:      Diet: DIET CARDIAC;    DVT prophylaxis: [] Lovenox                                 [] SCDs                                 [] SQ Heparin                                 [] Encourage ambulation           [x] Already on Anticoagulation - Heparin drip     Disposition:    [x] Home       [] TCU       [] Rehab       [] Psych       [] SNF       [] [place]       [] Other-    Code Status: Full Code    PT/OT Eval Status: Independant    Assessment/Plan:    Anticipated Discharge in : 24-48 hours    Active Hospital Problems    Diagnosis Date Noted    PE (pulmonary thromboembolism) ([facility] [address] [I26.99] 10/25/2017    Pneumonia due to organism [J18.9] 10/25/2017    Sepsis ([facility] [address] [A41.9] 10/25/2017    COPD exacerbation ([facility] [address] [J44.1] 10/25/2017       1. Pulmonary embolus, on heparin, needs OAC.   - Once a day dosing of [address] such as Xarelto would be a good alternative for this pt due to previous compliance issues and simplification of medication regime, if ok with cardiology. - CBC in am.   - BMP in am.   - Possibly home over the weekend if stable. 2. Pneumonia, on Doxycycline and Rocephin, continue  - Legionella and strep pneumoniae pending. 3. COPD/smoker/Shortness of breath, started on Methylprednisone today. 4. Diastolic dysfunction with EF 25-30%, on Lisinopril, dig, carvedilol, ASA, cardiology following.   - Is lifevest needed, percutaneous intervention. Pt states aversion to open heart, but is interested in being compliant.   - Discussed with Dr. Enrique Neff. He will explore what options are available for pt. - Plan per cardiology. Discussed with pt, Rn, and Dr. Natacha Mabry. Electronically signed by Zack Maharaj student [employer] on 10/27/2017 at 9:38 AM pulmonary embolus, no signs of coronary syndrome.  Continue to pain monitor, Toradol, rest of the chronic medically stable.    PAST MEDICAL HISTORY:  As above.    Deep venous thrombosis prophylaxis.  Patient on Xarelto.    CODE STATUS:  The patient is full code.  I plan to discuss with the patient and patient's significant other.  All their questions answered at this time.      Dictated By: Mansoor Perkins MD  Signing Provider: MD LM Mccall/MELISSA ([id number])  DD: 03/05/2017 10:26:15 TD: 03/05/2017 11:02:20    Copy Sent To:

## 2017-10-27 NOTE — PLAN OF CARE
Problem: Impaired respiratory status  Goal: Clear lung sounds  Outcome: Ongoing  Pt had no questions on purpose or side effects of medication. Will continue with treatments to help improve aeration t/o lungs.

## 2017-10-27 NOTE — PLAN OF CARE
Problem: Falls - Risk of  Goal: Absence of falls  Outcome: Ongoing  Patient has remained free from falls tonight. Call light and bedside table are within reach. Patient alert and oriented and uses his call light appropriately. Patient ambulates with stand-by assist to the bathroom. Fall precautions in place for his safety. Nurse checks on the patient every hour. Problem: Risk for Impaired Skin Integrity  Goal: Tissue integrity - skin and mucous membranes  Structural intactness and normal physiological function of skin and  mucous membranes. Outcome: Ongoing  No new areas of skin breakdown noted tonight. Skin is warm and dry. Patient has multiple skin integrity issues (see doc flowsheets for list). Patient able to turn and reposition himself while in bed. Allevyn on coccyx for prevention. Will continue to monitor. Problem: Pain Control  Goal: Maintain pain level at or below patient's acceptable level (or 5 if patient is unable to determine acceptable level)  Outcome: Met This Shift  Patient has denied having any pain tonight. Goal: Improvement in pain related behaviors BP/HR WNL  Outcome: Completed Date Met: 10/26/17  Vital signs have remained stable. Patient's blood pressure remains low, but stable. Problem: Neurological  Goal: Maximum potential motor/sensory/cognitive function  Outcome: Completed Date Met: 10/26/17  Patient alert and oriented and uses his call light appropriately. Problem: Cardiovascular  Goal: No DVT, peripheral vascular complications  Outcome: Ongoing  No signs or symptoms of DVT. Patient on a continuous heparin drip due to having bilateral pulmonary emboli. APTT being checked every 6 hours. Goal: Hemodynamic stability  Outcome: Ongoing  Vital signs have remained stable. Patient's blood pressure remains low, but stable. Vitals being monitored every 4 hours and as needed. Continuous cardiac monitor in place. Heart rhythm is NSR with preguent PVC's.      Problem: Respiratory  Goal: No pulmonary complications  Outcome: Ongoing  Patient admitted with bilateral pulmonary emboli. Patient on room air with oxygen saturations above 92%. Patient gets SOB with ambulation. Lungs are wheezy throughout. Respiratory therapy completing breathing treatments with the patient. Patient has a strong, congested, non-productive cough. Goal: O2 Sat > 90%  Outcome: Met This Shift  Patient on room air with oxygen saturations above 92%. Goal: Agreement to quit smoking  Outcome: Ongoing  Patient is a current 2 pack a day smoker. Patient refusing his Nicotine patch. Problem: Discharge Planning:  Goal: Discharged to appropriate level of care  Discharged to appropriate level of care   Outcome: Ongoing  No discharge plans currently. Patient plans to return to the [facility] when medically stable. Goal: Participates in care planning  Participates in care planning   Outcome: Met This Shift  Patient participates in his plan of care. Comments: Care plan reviewed with patient. Patient verbalize understanding of the plan of care and contribute to goal setting.

## 2017-10-28 LAB
ANION GAP SERPL CALCULATED.3IONS-SCNC: 8 MEQ/L (ref 8–16)
BUN BLDV-MCNC: 27 MG/DL (ref 7–22)
CALCIUM SERPL-MCNC: 8.5 MG/DL (ref 8.5–10.5)
CHLORIDE BLD-SCNC: 94 MEQ/L (ref 98–111)
CO2: 25 MEQ/L (ref 23–33)
CREAT SERPL-MCNC: 0.5 MG/DL (ref 0.4–1.2)
GFR SERPL CREATININE-BSD FRML MDRD: > 90 ML/MIN/1.73M2
GLUCOSE BLD-MCNC: 125 MG/DL (ref 70–108)
HCT VFR BLD CALC: 36.5 % (ref 42–52)
HEMOGLOBIN: 12.2 GM/DL (ref 14–18)
MCH RBC QN AUTO: 31.7 PG (ref 27–31)
MCHC RBC AUTO-ENTMCNC: 33.5 GM/DL (ref 33–37)
MCV RBC AUTO: 94.7 FL (ref 80–94)
MRSA SCREEN: NORMAL
PDW BLD-RTO: 15.8 % (ref 11.5–14.5)
PLATELET # BLD: 238 THOU/MM3 (ref 130–400)
PMV BLD AUTO: 9.6 MCM (ref 7.4–10.4)
POTASSIUM SERPL-SCNC: 5.1 MEQ/L (ref 3.5–5.2)
RBC # BLD: 3.86 MILL/MM3 (ref 4.7–6.1)
SODIUM BLD-SCNC: 127 MEQ/L (ref 135–145)
VRE CULTURE: NORMAL
WBC # BLD: 11.7 THOU/MM3 (ref 4.8–10.8)

## 2017-10-28 PROCEDURE — 85027 COMPLETE CBC AUTOMATED: CPT

## 2017-10-28 PROCEDURE — 6370000000 HC RX 637 (ALT 250 FOR IP): Performed by: NURSE PRACTITIONER

## 2017-10-28 PROCEDURE — 80048 BASIC METABOLIC PNL TOTAL CA: CPT

## 2017-10-28 PROCEDURE — 6370000000 HC RX 637 (ALT 250 FOR IP): Performed by: INTERNAL MEDICINE

## 2017-10-28 PROCEDURE — 2060000000 HC ICU INTERMEDIATE R&B

## 2017-10-28 PROCEDURE — 2580000003 HC RX 258: Performed by: INTERNAL MEDICINE

## 2017-10-28 PROCEDURE — 36415 COLL VENOUS BLD VENIPUNCTURE: CPT

## 2017-10-28 PROCEDURE — 94640 AIRWAY INHALATION TREATMENT: CPT

## 2017-10-28 PROCEDURE — 6360000002 HC RX W HCPCS: Performed by: INTERNAL MEDICINE

## 2017-10-28 PROCEDURE — 99232 SBSQ HOSP IP/OBS MODERATE 35: CPT | Performed by: INTERNAL MEDICINE

## 2017-10-28 RX ADMIN — ASPIRIN 325 MG: 325 TABLET, DELAYED RELEASE ORAL at 09:12

## 2017-10-28 RX ADMIN — DOCUSATE SODIUM 100 MG: 100 CAPSULE ORAL at 20:00

## 2017-10-28 RX ADMIN — Medication 10 ML: at 09:14

## 2017-10-28 RX ADMIN — FOLIC ACID 1 MG: 1 TABLET ORAL at 09:12

## 2017-10-28 RX ADMIN — PREDNISONE 40 MG: 20 TABLET ORAL at 09:12

## 2017-10-28 RX ADMIN — CARVEDILOL 3.12 MG: 3.12 TABLET, FILM COATED ORAL at 09:12

## 2017-10-28 RX ADMIN — IPRATROPIUM BROMIDE AND ALBUTEROL SULFATE 1 AMPULE: .5; 3 SOLUTION RESPIRATORY (INHALATION) at 11:39

## 2017-10-28 RX ADMIN — LISINOPRIL 5 MG: 5 TABLET ORAL at 09:12

## 2017-10-28 RX ADMIN — THERA TABS 1 TABLET: TAB at 09:12

## 2017-10-28 RX ADMIN — RIVAROXABAN 15 MG: 15 TABLET, FILM COATED ORAL at 17:05

## 2017-10-28 RX ADMIN — DOXYCYCLINE HYCLATE 100 MG: 100 TABLET, COATED ORAL at 09:12

## 2017-10-28 RX ADMIN — Medication 100 MG: at 09:12

## 2017-10-28 RX ADMIN — DOXYCYCLINE HYCLATE 100 MG: 100 TABLET, COATED ORAL at 20:00

## 2017-10-28 RX ADMIN — DIGOXIN 125 MCG: 0.12 TABLET ORAL at 09:12

## 2017-10-28 RX ADMIN — Medication 10 ML: at 20:01

## 2017-10-28 RX ADMIN — RIVAROXABAN 15 MG: 15 TABLET, FILM COATED ORAL at 09:12

## 2017-10-28 RX ADMIN — WATER 1 G: 1 INJECTION INTRAMUSCULAR; INTRAVENOUS; SUBCUTANEOUS at 22:20

## 2017-10-28 RX ADMIN — IPRATROPIUM BROMIDE AND ALBUTEROL SULFATE 1 AMPULE: .5; 3 SOLUTION RESPIRATORY (INHALATION) at 07:36

## 2017-10-28 RX ADMIN — IPRATROPIUM BROMIDE AND ALBUTEROL SULFATE 1 AMPULE: .5; 3 SOLUTION RESPIRATORY (INHALATION) at 20:50

## 2017-10-28 RX ADMIN — IPRATROPIUM BROMIDE AND ALBUTEROL SULFATE 1 AMPULE: .5; 3 SOLUTION RESPIRATORY (INHALATION) at 15:27

## 2017-10-28 RX ADMIN — CARVEDILOL 3.12 MG: 3.12 TABLET, FILM COATED ORAL at 17:05

## 2017-10-28 ASSESSMENT — PAIN SCALES - GENERAL: PAINLEVEL_OUTOF10: 0

## 2017-10-28 NOTE — PLAN OF CARE
Problem: Falls - Risk of  Goal: Absence of falls  Outcome: Ongoing  Patient has remained free from falls tonight. Call light and bedside table are within reach. Patient alert and oriented and uses his call light appropriately. Patient ambulates with stand-by assist to the bathroom. Fall precautions in place for his safety. Nurse checks on the patient every hour. Problem: Risk for Impaired Skin Integrity  Goal: Tissue integrity - skin and mucous membranes  Structural intactness and normal physiological function of skin and  mucous membranes. Outcome: Ongoing  No new areas of skin breakdown noted tonight. Skin is warm and dry. Patient has multiple skin integrity issues (see doc flowsheets for list). Nurse encouraging the patient to turn and reposition every 2 hours to prevent breakdown. Allevyn on coccyx for prevention. Will continue to monitor.        Problem: Pain Control  Goal: Maintain pain level at or below patient's acceptable level (or 5 if patient is unable to determine acceptable level)  Outcome: Met This Shift  Patient has denied having any pain tonight. Problem: Cardiovascular  Goal: No DVT, peripheral vascular complications  Outcome: Ongoing  No signs or symptoms of DVT. Patient's heparin drip was stopped today and was switched to PO Xarelto. Will continue to monitor. Goal: Hemodynamic stability  Outcome: Met This Shift  Vital signs have remained stable. Vitals being monitored every 4 hours and as needed. Continuous cardiac monitor in place. Heart rhythm is NSR with preguent PVC's. Problem: Respiratory  Goal: No pulmonary complications  Outcome: Ongoing  Patient admitted with bilateral pulmonary emboli. Patient on room air with oxygen saturations above 92%. Patient gets SOB with ambulation. Lungs are diminished and wheezy throughout. Respiratory therapy completing breathing treatments with the patient. Patient has a strong, congested, non-productive cough. Goal: O2 Sat > 90%  Outcome:  Met This Shift  Patient on room air with oxygen saturations above 92%. Goal: Agreement to quit smoking  Outcome: Ongoing  Patient refusing his Nicotine patch. Problem: Discharge Planning:  Goal: Discharged to appropriate level of care  Discharged to appropriate level of care   Outcome: Ongoing  Social work helping with discharge plans. Patient plans to return to [facility] when medically stable. Goal: Participates in care planning  Participates in care planning   Outcome: Met This Shift  Patient participates in his plan of care. Comments: Care plan reviewed with patient. Patient verbalize understanding of the plan of care and contribute to goal setting.

## 2017-10-28 NOTE — PROGRESS NOTES
[facility]    Patient - Diego Guzman   MRN -  [id number]   [facility]   - 1955      Date of Admission -  10/25/2017  6:04 PM  Date of evaluation -  10/28/2017  Room - [address] [place] [address] Primary Care Physician - Gianni Hermosillo CNP   Chief Complaint/Reason for [facility] Problems    Diagnosis Date Noted    PE (pulmonary thromboembolism) ([facility] [address] [I26.99] 10/25/2017    Pneumonia due to organism [J18.9] 10/25/2017    Sepsis ([address] [A41.9] 10/25/2017    COPD exacerbation ([facility] [address] [J44.1] 10/25/2017     HPI   Diego Guzman is a 60 y.o. male admitted for chest pain and shortness of breath. 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 Patient reports that he has had persistent left chest pain that started on 10/23/17 after eating \"bad chicken wing\". He describes the pain as worsening over time as a constant ache. Patient is currently residing at [email or web address]. Currently smokes 2 PPD for past 50 years.  Patient denies having been treated for breathing issues in the past, denies taking any inhalers, or following with any pulmonologist in the past.   Past 24 Hours   -afebrile x 24+ hours  -on RA  -feels great  -eating well    All other systems reviewed  Meds    Current Medications    predniSONE  40 mg Oral Daily    rivaroxaban  15 mg Oral BID WC    Followed by   Donnis Hashimoto ON 2017] rivaroxaban  20 mg Oral Daily    influenza virus vaccine  0.5 mL Intramuscular Once    potassium (CARDIAC) replacement protocol   Other RX Placeholder    magnesium replacement protocol   Other RX Placeholder    potassium chloride  20 mEq Oral BID     doxycycline hyclate  100 mg Oral 2 times per day    cefTRIAXone (ROCEPHIN) IV  1 g Intravenous Q24H    lisinopril  5 mg Oral Daily    carvedilol  3.125 mg Oral BID WC    digoxin  125 mcg Oral Daily    thiamine  100 mg Oral Daily    multivitamin  1 tablet Oral Daily    folic acid  1 mg Oral Daily    aspirin  325 mg Oral Daily    sodium chloride flush  10 mL Intravenous 2 times per day    docusate sodium  100 mg Oral BID    nicotine  1 patch Transdermal Daily    ipratropium-albuterol  1 ampule Inhalation Q4H WA     sodium chloride flush, acetaminophen, ondansetron  IV Drips/Infusions     Vitals    Vitals    height is 5' 4\" (1.626 m) and weight is 143 lb 14.4 oz (65.3 kg). His oral temperature is 97.5 °F (36.4 °C). His blood pressure is 134/60 and his pulse is 84. His respiration is 18 and oxygen saturation is 94%. O2 Flow Rate (L/min): 0.5 L/min  I/O    Intake/Output Summary (Last 24 hours) at 10/28/17 0744  Last data filed at 10/28/17 0419   Gross per 24 hour   Intake             1690 ml   Output             1075 ml   Net              615 ml     Patient Vitals for the past 96 hrs (Last 3 readings):   Weight   10/28/17 0419 143 lb 14.4 oz (65.3 kg)   10/27/17 0341 138 lb 3 oz (62.7 kg)   10/25/17 2347 140 lb 9.6 oz (63.8 kg)     Exam   Physical Exam   Constitutional:  Patient appears moderately built and  moderately nourished. [place]  Head: Normocephalic and atraumatic. Mouth/Throat: Oropharynx is clear and moist.    Eyes: Conjunctivae are normal.    Neck: Neck supple. No tracheal deviation present. Cardiovascular: Regular rate, regular rhythm, (+) murmur. No peripheral edema  Pulmonary/Chest: Normal effort with bilateral air entry noted scattered rhonchi on left side. No stridor. No respiratory distress. Abdominal: Soft. Bowel sounds audible. No distension or tenderness to palp. Musculoskeletal: Moves all extremities   Neurological: Patient is alert and oriented to person, place, and time. Skin: Warm and dry.      Labs   ABG  Lab Results   Component Value Date    PH 7.47 10/25/2017    PO2 61 10/25/2017    PCO2 35 10/25/2017    HCO3 25 10/25/2017    O2SAT 93 10/25/2017     Lab Results   Component Value Date    IFIO2 2 10/25/2017     CBC  Recent Labs      10/25/17   1858  10/26/17   0351  10/27/17   0204  10/28/17   0420   WBC  19.1*  14.1*   --   11.7*   RBC  4.14*  4.00*   --   3.86*   HGB  13.2*  12.6*   --   12.2*   HCT  38.7*  37.5*   --   36.5*   MCV  93.6  93.8   --   94.7*   MCH  31.8*  31.5*   --   31.7*   MCHC  34.0  33.5   --   33.5   RDW  15.5*  15.7*   --   15.8*   PLT  274  270  260  238   MPV  8.5  8.9   --   9.6      BMP  Recent Labs      10/25/17   1858  10/26/17   0351  10/26/17   1553  10/27/17   0204  10/28/17   0420   NA  125*  128*  132*   --   127*   K  4.6  3.6   --   3.9  5.1   CL  87*  93*   --    --   94*   CO2  25  24   --    --   25   BUN  11  10   --    --   27*   CREATININE  0.6  0.5   --    --   0.5   GLUCOSE  99  83   --    --   125*   MG  1.9  1.8   --   2.9*   --    CALCIUM  9.1  8.2*   --    --   8.5     LFT  Recent Labs      10/25/17   1858   AST  23   ALT  15   BILITOT  0.6   ALKPHOS  74   LIPASE  21.8     TROP  Lab Results   Component Value Date    TROPONINT < 0.010 10/25/2017    TROPONINT < 0.010 05/12/2017     BNP  Lab Results   Component Value Date    PROBNP 35284.0 10/25/2017    PROBNP 1821.0 05/12/2017     D-Dimer  No results found for: DDIMER  Lactic Acid  Recent Labs      10/25/17   2308   LACTA  1.0     INR  Recent Labs      10/25/17   1858   INR  1.15*     PTT  Recent Labs      10/27/17   0204  10/27/17   0833  10/27/17   1432   APTT  54.5*  48.0*  65.2*     Glucose  No results for input(s): POCGLU in the last 72 hours. UA   Recent Labs      10/26/17   0430   PHUR  6.0   COLORU  DK YELLOW*   PROTEINU  30*   BLOODU  SMALL*   RBCUA  5-10   WBCUA  0-2   BACTERIA  NONE   NITRU  NEGATIVE   GLUCOSEU  NEGATIVE   BILIRUBINUR  NEGATIVE   UROBILINOGEN  1.0   KETUA  NEGATIVE   . PFTs   N/A  Echo    10/26/2017   Summary   Mildly dilated LV.    LV systolic function is

## 2017-10-28 NOTE — PROGRESS NOTES
Hospitalist Progress Note    Patient:  Juwan Galvan      Unit/Bed:4K-14/014-A    YOB: 1955    MRN: [id number]       Acct: [de-identified]     PCP: Marisol Verde CNP    Date of Admission: 10/25/2017    Chief Complaint: Chest pain, SOB    Hospital Course: Per previous note: Pt with psychiatric history, as well as history of medical non-compliance, admitted with PE , pneumonia on Rocephin & Doxycycline, possible sepsis, has been on Heparin gtt, notable severe aortic regurge, cardiology following. Pt has declined surgery, but is willing to follow up with cardiology and continue medications after discharge. Subjective: He denies C/P, SOB, NVD. He states he has been drinking 2-3 L of water, plus fluids with trays. States \"I thought drinking a lot of water was good for you\". Is interested in seeing what options are available to him other than surgery.        Medications:  Reviewed    Infusion Medications    Scheduled Medications    predniSONE  40 mg Oral Daily    rivaroxaban  15 mg Oral BID WC    Followed by   Carri Teran ON 11/17/2017] rivaroxaban  20 mg Oral Daily    influenza virus vaccine  0.5 mL Intramuscular Once    potassium (CARDIAC) replacement protocol   Other RX Placeholder    magnesium replacement protocol   Other RX Placeholder    potassium chloride  20 mEq Oral BID WC    doxycycline hyclate  100 mg Oral 2 times per day    cefTRIAXone (ROCEPHIN) IV  1 g Intravenous Q24H    lisinopril  5 mg Oral Daily    carvedilol  3.125 mg Oral BID WC    digoxin  125 mcg Oral Daily    thiamine  100 mg Oral Daily    multivitamin  1 tablet Oral Daily    folic acid  1 mg Oral Daily    aspirin  325 mg Oral Daily    sodium chloride flush  10 mL Intravenous 2 times per day    docusate sodium  100 mg Oral BID    nicotine  1 patch Transdermal Daily    ipratropium-albuterol  1 ampule Inhalation Q4H WA     PRN Meds: sodium chloride flush, acetaminophen, ondansetron      Intake/Output Summary (Last 24 hours) at 10/28/17 0824  Last data filed at 10/28/17 0276   Gross per 24 hour   Intake             1690 ml   Output             1075 ml   Net              615 ml       Diet:  DIET CARDIAC; Exam:  /60   Pulse 84   Temp 97.5 °F (36.4 °C) (Oral)   Resp 18   Ht 5' 4\" (1.626 m)   Wt 143 lb 14.4 oz (65.3 kg)   SpO2 94%   BMI 24.70 kg/m²     General appearance: No apparent distress, appears stated age and cooperative. HEENT: Pupils equal, round, and reactive to light. Conjunctivae/corneas clear. Neck: Supple, with full range of motion. No jugular venous distention. Trachea midline. Respiratory:  Normal respiratory effort. Clear to auscultation, bilaterally without Rales/Wheezes/Rhonchi. Cardiovascular: Regular rate and rhythm with normal S1/S2 without murmurs, rubs or gallops. Abdomen: Soft, non-tender, non-distended with normal bowel sounds. Musculoskeletal: No clubbing, cyanosis or edema bilaterally. Full range of motion without deformity. Skin: Skin color, texture, turgor normal.  No rashes or lesions. Neurologic:  Neurovascularly intact without any focal sensory/motor deficits.  Cranial nerves: II-XII intact, grossly non-focal.  Psychiatric: Alert and oriented, thought content appropriate, normal insight  Capillary Refill: Brisk,< 3 seconds   Peripheral Pulses: +2 palpable, equal bilaterally       Labs:   Recent Labs      10/25/17   1858  10/26/17   0351  10/27/17   0204  10/28/17   0420   WBC  19.1*  14.1*   --   11.7*   HGB  13.2*  12.6*   --   12.2*   HCT  38.7*  37.5*   --   36.5*   PLT  274  270  260  238     Recent Labs      10/25/17   1858  10/26/17   0351  10/26/17   1553  10/27/17   0204  10/28/17   0420   NA  125*  128*  132*   --   127*   K  4.6  3.6   --   3.9  5.1   CL  87*  93*   --    --   94*   CO2  25  24   --    --   25   BUN  11  10   --    --   27*   CREATININE  0.6  0.5   --    --   0.5   CALCIUM  9.1  8.2*   --    --   8.5     Recent Labs      10/25/17   2839   AST 23   ALT  15   BILIDIR  <0.2   BILITOT  0.6   ALKPHOS  74     Recent Labs      10/25/17   1858   INR  1.15*     No results for input(s): Lachelle Peña in the last 72 hours. Urinalysis:    Lab Results   Component Value Date    NITRU NEGATIVE 10/26/2017    WBCUA 0-2 10/26/2017    BACTERIA NONE 10/26/2017    RBCUA 5-10 10/26/2017    BLOODU SMALL 10/26/2017    GLUCOSEU NEGATIVE 10/26/2017       Radiology:      Diet: DIET CARDIAC;    DVT prophylaxis: [] Lovenox                                 [] SCDs                                 [] SQ Heparin                                 [] Encourage ambulation           [x] Already on Anticoagulation     Disposition:    [x] Home       [] TCU       [] Rehab       [] Psych       [] SNF       [] [place]       [] Other-    Code Status: Full Code    PT/OT Eval Status: independent in care    Assessment/Plan:    Anticipated Discharge in : less than 24 hours    Active Hospital Problems    Diagnosis Date Noted    [facility]) [facility] [I26.99] 10/25/2017    Pneumonia due to organism [J18.9] 10/25/2017    Sepsis ([facility] [A41.9] 10/25/2017    COPD exacerbation ([facility] [J44.1] 10/25/2017       1. Pulmonary embolus, on Xarelto, unclear etiology  - States SOB improving daily  - Continue OAC.   - BMP in am.     2. Hyponatremia, likely secondary to avid water intake, drinking approx 3 liters of water, plus additional fluids on trays  - 2 liter fluid restriction  - Educated pt, verbalizes understanding. 3. Mild hyperkalemia, discontinue potassium supplementation. 4. Pneumonia on Doxy and Rocephin  - Strep pneumoniae and legionella pending. 5. COPD/smoker/SOB, on Methyprednisone, on oral Prednisone 40 mg daily, not requiring O2.   6. Diastolic dysfunction with EF 25-30%, on lisinopril, dig, ASA  - cardiology on case, appreciate input concerning options for AR    Discussed with Dr. Lani Caldwell.      Electronically signed by Salazar Toledo on 10/28/2017

## 2017-10-29 LAB
ANION GAP SERPL CALCULATED.3IONS-SCNC: 9 MEQ/L (ref 8–16)
BUN BLDV-MCNC: 19 MG/DL (ref 7–22)
CALCIUM SERPL-MCNC: 8.7 MG/DL (ref 8.5–10.5)
CHLORIDE BLD-SCNC: 98 MEQ/L (ref 98–111)
CO2: 26 MEQ/L (ref 23–33)
CREAT SERPL-MCNC: 0.5 MG/DL (ref 0.4–1.2)
GFR SERPL CREATININE-BSD FRML MDRD: > 90 ML/MIN/1.73M2
GLUCOSE BLD-MCNC: 97 MG/DL (ref 70–108)
LEGIONELLA URINARY AG: NEGATIVE
POTASSIUM SERPL-SCNC: 5.2 MEQ/L (ref 3.5–5.2)
SODIUM BLD-SCNC: 133 MEQ/L (ref 135–145)
STREP PNEUMO AG, UR: NEGATIVE

## 2017-10-29 PROCEDURE — 6360000002 HC RX W HCPCS: Performed by: INTERNAL MEDICINE

## 2017-10-29 PROCEDURE — 6370000000 HC RX 637 (ALT 250 FOR IP): Performed by: INTERNAL MEDICINE

## 2017-10-29 PROCEDURE — 6370000000 HC RX 637 (ALT 250 FOR IP): Performed by: NURSE PRACTITIONER

## 2017-10-29 PROCEDURE — 99232 SBSQ HOSP IP/OBS MODERATE 35: CPT | Performed by: INTERNAL MEDICINE

## 2017-10-29 PROCEDURE — 99232 SBSQ HOSP IP/OBS MODERATE 35: CPT | Performed by: NURSE PRACTITIONER

## 2017-10-29 PROCEDURE — 94640 AIRWAY INHALATION TREATMENT: CPT

## 2017-10-29 PROCEDURE — 2580000003 HC RX 258: Performed by: INTERNAL MEDICINE

## 2017-10-29 PROCEDURE — 80048 BASIC METABOLIC PNL TOTAL CA: CPT

## 2017-10-29 PROCEDURE — 2060000000 HC ICU INTERMEDIATE R&B

## 2017-10-29 PROCEDURE — 36415 COLL VENOUS BLD VENIPUNCTURE: CPT

## 2017-10-29 RX ADMIN — LISINOPRIL 5 MG: 5 TABLET ORAL at 08:17

## 2017-10-29 RX ADMIN — ASPIRIN 325 MG: 325 TABLET, DELAYED RELEASE ORAL at 08:17

## 2017-10-29 RX ADMIN — IPRATROPIUM BROMIDE AND ALBUTEROL SULFATE 1 AMPULE: .5; 3 SOLUTION RESPIRATORY (INHALATION) at 13:38

## 2017-10-29 RX ADMIN — DOXYCYCLINE HYCLATE 100 MG: 100 TABLET, COATED ORAL at 08:17

## 2017-10-29 RX ADMIN — Medication 100 MG: at 08:17

## 2017-10-29 RX ADMIN — IPRATROPIUM BROMIDE AND ALBUTEROL SULFATE 1 AMPULE: .5; 3 SOLUTION RESPIRATORY (INHALATION) at 17:21

## 2017-10-29 RX ADMIN — THERA TABS 1 TABLET: TAB at 08:17

## 2017-10-29 RX ADMIN — DIGOXIN 125 MCG: 0.12 TABLET ORAL at 08:17

## 2017-10-29 RX ADMIN — IPRATROPIUM BROMIDE AND ALBUTEROL SULFATE 1 AMPULE: .5; 3 SOLUTION RESPIRATORY (INHALATION) at 08:57

## 2017-10-29 RX ADMIN — FOLIC ACID 1 MG: 1 TABLET ORAL at 08:17

## 2017-10-29 RX ADMIN — Medication 10 ML: at 20:47

## 2017-10-29 RX ADMIN — RIVAROXABAN 15 MG: 15 TABLET, FILM COATED ORAL at 08:17

## 2017-10-29 RX ADMIN — CARVEDILOL 3.12 MG: 3.12 TABLET, FILM COATED ORAL at 17:04

## 2017-10-29 RX ADMIN — RIVAROXABAN 15 MG: 15 TABLET, FILM COATED ORAL at 17:04

## 2017-10-29 RX ADMIN — PREDNISONE 40 MG: 20 TABLET ORAL at 08:17

## 2017-10-29 RX ADMIN — DOXYCYCLINE HYCLATE 100 MG: 100 TABLET, COATED ORAL at 20:47

## 2017-10-29 RX ADMIN — CARVEDILOL 3.12 MG: 3.12 TABLET, FILM COATED ORAL at 08:17

## 2017-10-29 RX ADMIN — WATER 1 G: 1 INJECTION INTRAMUSCULAR; INTRAVENOUS; SUBCUTANEOUS at 20:49

## 2017-10-29 RX ADMIN — Medication 10 ML: at 08:17

## 2017-10-29 ASSESSMENT — PAIN SCALES - GENERAL
PAINLEVEL_OUTOF10: 0
PAINLEVEL_OUTOF10: 0

## 2017-10-29 NOTE — PROGRESS NOTES
exhibited mildly increased thickness and normal cuspal separation   of the aortic valve. Moderate-to-severe aortic regurgitation is noted. No aortic stenosis is present. Trace mitral regurgitation is present. Mild tricuspid regurgitation visualized. Pulmonary systolic pressure was normal. and is estimated at 25-30 mmHg. No evidence of any pericardial effusion. Cultures    Procalcitonin  Lab Results   Component Value Date    PROCAL 1.31 10/25/2017     Blood Culture x 2 (-) prelim  MRSA (-)  VRE (-)  Influenza (-)  Resp culture-ordered but not obtained yet  Legionella (-)  Stept (-)    Radiology    CXR    10/25/2017   Left mid and upper lung pneumonia. CT Scans    CTA CHEST W WO CONTRAST  10/25/2017   1. Acute thrombus in the bilateral pulmonary arteries extending into lobular branches as detailed above. 2. Left lower lobe pneumonia and trace left-sided pleural effusion. 3. Chronic lung disease. Enlarged main pulmonary artery may be secondary to pulmonary arterial hypertension. 4. Grade 4 L1 compression fracture of indeterminate age. 5. Nondisplaced fracture of the posterior 11th right rib, also of indeterminate age. Bilateral lower extremity venous duplex examination  10/26/2017   1. No sonographic evidence of lower extremity DVT.     (See actual reports for details)    Assessment   -Bilateral Pulmonary Embolism (POA)  -Community acquired pneumonia (POA)  -Tobacco Abuse  -Moderate-to-severe aortic regurgitation~cardiology saw and pt refused surgery     Recommendations   -Rocephin 10/25/Doxy 10/26  -Prednisone 40 mg PO daily x 5 days  -Duonebs Q4 hrs WA  -Follow cultures  -Nicoderm patch  -Xarelto 15 mg PO BID x 21 days then 20 mg daily for remainder of treatment time (needs 3 months)  -on RA  -place home 02 eval to check if needed with activity  -F/U with SOFIA Bradshaw or Dr Jackie Mcdowell in 2-3 months with PA and lat CXR and PFT's before visit (placed in Epic)  -we discussed smoking cessation and he informed me \"I have been smoking for a long time and that is going to be very hard for me\"    Case discussed with nurse and patient. Questions and concerns addressed. Meds and Orders reviewed.     Electronically signed by   Jose Duque CNP on 10/29/2017 at 8:12 AM

## 2017-10-29 NOTE — PLAN OF CARE
Problem: Falls - Risk of  Goal: Absence of falls  Outcome: Ongoing  Patient has remained free from falls tonight. Call light and bedside table are within reach. Patient alert and oriented and uses his call light appropriately. Patient ambulates with stand-by assist to the bathroom. Problem: Risk for Impaired Skin Integrity  Goal: Tissue integrity - skin and mucous membranes  Structural intactness and normal physiological function of skin and  mucous membranes. Outcome: Ongoing  No new areas of skin breakdown noted tonight. Skin is warm and dry. Problem: Pain Control  Goal: Maintain pain level at or below patient's acceptable level (or 5 if patient is unable to determine acceptable level)  Outcome: Met This Shift  Patient has denied having any pain tonight. Problem: Cardiovascular  Goal: No DVT, peripheral vascular complications  Outcome: Ongoing  Patient taking Xarelto BID for DVT prophylaxis. Goal: Hemodynamic stability  Outcome: Met This Shift  Vital signs have remained stable. Vitals being monitored every 4 hours and as needed. Problem: Respiratory  Goal: No pulmonary complications  Outcome: Ongoing  Patient admitted with bilateral pulmonary emboli. Patient on room air with oxygen saturations above 92%. Patient gets SOB with ambulation. Lungs are diminished throughout. Respiratory therapy completing breathing treatments with the patient. Patient has a strong, congested, non-productive cough. Goal: O2 Sat > 90%  Outcome: Met This Shift  Patient on room air with oxygen saturations above 92%. Goal: Agreement to quit smoking  Outcome: Ongoing  Patient refusing his Nicotine patch. Problem: Discharge Planning:  Goal: Discharged to appropriate level of care  Discharged to appropriate level of care   Outcome: Ongoing  No discharge plans. Goal: Participates in care planning  Participates in care planning   Outcome: Met This Shift  Patient participates in his plan of care.      Comments: Care plan reviewed with patient. Patient verbalize understanding of the plan of care and contribute to goal setting.

## 2017-10-29 NOTE — PROGRESS NOTES
Hospitalist Progress Note    Patient:  Sabina Gilliam      Unit/Bed:4K-14/014-A    YOB: 1955    MRN: [id number]       Acct: [de-identified]     PCP: Mateo Santoro CNP    Date of Admission: 10/25/2017    Chief Complaint: Chest pain, SOB    Hospital Course: Per previous note: Pt with psychiatric history, as well as history of medical non-compliance, admitted with PE , pneumonia on Rocephin & Doxycycline, possible sepsis, has been on Heparin gtt, notable severe aortic regurge, cardiology following. Pt has declined surgery, but is willing to follow up with cardiology and continue medications after discharge. He denies C/P, SOB, NVD. He states he has been drinking 2-3 L of water, plus fluids with trays. States \"I thought drinking a lot of water was good for you\". Is interested in seeing what options are available to him other than surgery.      Subjective: 10/29/17 - Again denies complaints, says he feels well      Medications:  Reviewed    Infusion Medications    Scheduled Medications    predniSONE  40 mg Oral Daily    rivaroxaban  15 mg Oral BID WC    Followed by   Maria Eugenia Webber ON 11/17/2017] rivaroxaban  20 mg Oral Daily    influenza virus vaccine  0.5 mL Intramuscular Once    potassium (CARDIAC) replacement protocol   Other RX Placeholder    magnesium replacement protocol   Other RX Placeholder    doxycycline hyclate  100 mg Oral 2 times per day    cefTRIAXone (ROCEPHIN) IV  1 g Intravenous Q24H    lisinopril  5 mg Oral Daily    carvedilol  3.125 mg Oral BID     digoxin  125 mcg Oral Daily    thiamine  100 mg Oral Daily    multivitamin  1 tablet Oral Daily    folic acid  1 mg Oral Daily    aspirin  325 mg Oral Daily    sodium chloride flush  10 mL Intravenous 2 times per day    docusate sodium  100 mg Oral BID    nicotine  1 patch Transdermal Daily    ipratropium-albuterol  1 ampule Inhalation Q4H WA     PRN Meds: sodium chloride flush, acetaminophen, ondansetron      Intake/Output Summary (Last 24 hours) at 10/29/17 1730  Last data filed at 10/29/17 1300   Gross per 24 hour   Intake             1790 ml   Output             1950 ml   Net             -160 ml       Diet:  DIET CARDIAC; Daily Fluid Restriction: 2000 ml    Exam:  BP (!) 155/70   Pulse 61   Temp 98.2 °F (36.8 °C) (Oral)   Resp 18   Ht 5' 4\" (1.626 m)   Wt 142 lb (64.4 kg)   SpO2 95%   BMI 24.37 kg/m²     General appearance: No apparent distress, appears stated age and cooperative. HEENT: Pupils equal, round, and reactive to light. Conjunctivae/corneas clear. Neck: Supple, with full range of motion. No jugular venous distention. Trachea midline. Respiratory:  Normal respiratory effort. Clear to auscultation, bilaterally without Rales/Wheezes/Rhonchi. Cardiovascular: Regular rate and rhythm with normal S1/S2 without murmurs, rubs or gallops. Abdomen: Soft, non-tender, non-distended with normal bowel sounds. Musculoskeletal: No clubbing, cyanosis or edema bilaterally. Full range of motion without deformity. Skin: Skin color, texture, turgor normal.  No rashes or lesions. Neurologic:  Neurovascularly intact without any focal sensory/motor deficits. Cranial nerves: II-XII intact, grossly non-focal.  Psychiatric: Alert and oriented, thought content appropriate, normal insight  Capillary Refill: Brisk,< 3 seconds   Peripheral Pulses: +2 palpable, equal bilaterally       Labs:   Recent Labs      10/27/17   0204  10/28/17   0420   WBC   --   11.7*   HGB   --   12.2*   HCT   --   36.5*   PLT  260  238     Recent Labs      10/27/17   0204  10/28/17   0420  10/29/17   0334   NA   --   127*  133*   K  3.9  5.1  5.2   CL   --   94*  98   CO2   --   25  26   BUN   --   27*  19   CREATININE   --   0.5  0.5   CALCIUM   --   8.5  8.7     No results for input(s): AST, ALT, BILIDIR, BILITOT, ALKPHOS in the last 72 hours. No results for input(s): INR in the last 72 hours.   No results for input(s):

## 2017-10-30 ENCOUNTER — APPOINTMENT (OUTPATIENT)
Dept: GENERAL RADIOLOGY | Age: 62
DRG: 720 | End: 2017-10-30
Payer: MEDICAID

## 2017-10-30 VITALS
HEART RATE: 87 BPM | DIASTOLIC BLOOD PRESSURE: 60 MMHG | OXYGEN SATURATION: 94 % | TEMPERATURE: 98 F | WEIGHT: 139.5 LBS | BODY MASS INDEX: 23.82 KG/M2 | HEIGHT: 64 IN | SYSTOLIC BLOOD PRESSURE: 128 MMHG | RESPIRATION RATE: 18 BRPM

## 2017-10-30 LAB
ANION GAP SERPL CALCULATED.3IONS-SCNC: 11 MEQ/L (ref 8–16)
BUN BLDV-MCNC: 12 MG/DL (ref 7–22)
CALCIUM SERPL-MCNC: 8.9 MG/DL (ref 8.5–10.5)
CHLORIDE BLD-SCNC: 95 MEQ/L (ref 98–111)
CO2: 26 MEQ/L (ref 23–33)
CREAT SERPL-MCNC: 0.4 MG/DL (ref 0.4–1.2)
GFR SERPL CREATININE-BSD FRML MDRD: > 90 ML/MIN/1.73M2
GLUCOSE BLD-MCNC: 73 MG/DL (ref 70–108)
POTASSIUM SERPL-SCNC: 4.8 MEQ/L (ref 3.5–5.2)
SODIUM BLD-SCNC: 132 MEQ/L (ref 135–145)

## 2017-10-30 PROCEDURE — 36415 COLL VENOUS BLD VENIPUNCTURE: CPT

## 2017-10-30 PROCEDURE — 6370000000 HC RX 637 (ALT 250 FOR IP): Performed by: INTERNAL MEDICINE

## 2017-10-30 PROCEDURE — 99238 HOSP IP/OBS DSCHRG MGMT 30/<: CPT | Performed by: INTERNAL MEDICINE

## 2017-10-30 PROCEDURE — 99232 SBSQ HOSP IP/OBS MODERATE 35: CPT | Performed by: INTERNAL MEDICINE

## 2017-10-30 PROCEDURE — 6370000000 HC RX 637 (ALT 250 FOR IP): Performed by: NURSE PRACTITIONER

## 2017-10-30 PROCEDURE — 2580000003 HC RX 258: Performed by: INTERNAL MEDICINE

## 2017-10-30 PROCEDURE — 94640 AIRWAY INHALATION TREATMENT: CPT

## 2017-10-30 PROCEDURE — 80048 BASIC METABOLIC PNL TOTAL CA: CPT

## 2017-10-30 PROCEDURE — 71010 XR CHEST PORTABLE: CPT

## 2017-10-30 PROCEDURE — 94761 N-INVAS EAR/PLS OXIMETRY MLT: CPT

## 2017-10-30 RX ORDER — PREDNISONE 20 MG/1
40 TABLET ORAL DAILY
Qty: 4 TABLET | Refills: 0 | Status: SHIPPED | OUTPATIENT
Start: 2017-10-31 | End: 2017-11-02

## 2017-10-30 RX ORDER — MULTIVITAMIN WITH FOLIC ACID 400 MCG
1 TABLET ORAL DAILY
Qty: 30 TABLET | Refills: 0 | Status: ON HOLD | OUTPATIENT
Start: 2017-10-31 | End: 2019-05-23

## 2017-10-30 RX ORDER — DOXYCYCLINE HYCLATE 100 MG
100 TABLET ORAL EVERY 12 HOURS SCHEDULED
Qty: 8 TABLET | Refills: 0 | Status: SHIPPED | OUTPATIENT
Start: 2017-10-30 | End: 2017-11-03

## 2017-10-30 RX ORDER — LISINOPRIL 5 MG/1
5 TABLET ORAL DAILY
Qty: 30 TABLET | Refills: 0 | Status: ON HOLD | OUTPATIENT
Start: 2017-10-31 | End: 2019-05-23

## 2017-10-30 RX ORDER — DIGOXIN 125 MCG
125 TABLET ORAL DAILY
Qty: 30 TABLET | Refills: 0 | Status: ON HOLD | OUTPATIENT
Start: 2017-10-31 | End: 2019-05-23

## 2017-10-30 RX ORDER — FOLIC ACID 1 MG/1
1 TABLET ORAL DAILY
Qty: 30 TABLET | Refills: 0 | Status: ON HOLD | OUTPATIENT
Start: 2017-10-31 | End: 2019-05-23

## 2017-10-30 RX ORDER — LANOLIN ALCOHOL/MO/W.PET/CERES
100 CREAM (GRAM) TOPICAL DAILY
Qty: 30 TABLET | Refills: 0 | Status: ON HOLD | OUTPATIENT
Start: 2017-10-31 | End: 2019-05-23

## 2017-10-30 RX ORDER — CARVEDILOL 3.12 MG/1
3.12 TABLET ORAL 2 TIMES DAILY WITH MEALS
Qty: 60 TABLET | Refills: 0 | Status: ON HOLD | OUTPATIENT
Start: 2017-10-30 | End: 2019-05-23

## 2017-10-30 RX ORDER — CEFUROXIME AXETIL 500 MG/1
500 TABLET ORAL 2 TIMES DAILY
Qty: 10 TABLET | Refills: 0 | Status: SHIPPED | OUTPATIENT
Start: 2017-10-30 | End: 2017-11-04

## 2017-10-30 RX ADMIN — RIVAROXABAN 15 MG: 15 TABLET, FILM COATED ORAL at 16:45

## 2017-10-30 RX ADMIN — IPRATROPIUM BROMIDE AND ALBUTEROL SULFATE 1 AMPULE: .5; 3 SOLUTION RESPIRATORY (INHALATION) at 08:11

## 2017-10-30 RX ADMIN — THERA TABS 1 TABLET: TAB at 08:53

## 2017-10-30 RX ADMIN — Medication 10 ML: at 08:54

## 2017-10-30 RX ADMIN — CARVEDILOL 3.12 MG: 3.12 TABLET, FILM COATED ORAL at 16:45

## 2017-10-30 RX ADMIN — ASPIRIN 325 MG: 325 TABLET, DELAYED RELEASE ORAL at 08:52

## 2017-10-30 RX ADMIN — DOCUSATE SODIUM 100 MG: 100 CAPSULE ORAL at 08:52

## 2017-10-30 RX ADMIN — RIVAROXABAN 15 MG: 15 TABLET, FILM COATED ORAL at 08:52

## 2017-10-30 RX ADMIN — DIGOXIN 125 MCG: 0.12 TABLET ORAL at 08:52

## 2017-10-30 RX ADMIN — Medication 100 MG: at 08:52

## 2017-10-30 RX ADMIN — CARVEDILOL 3.12 MG: 3.12 TABLET, FILM COATED ORAL at 08:52

## 2017-10-30 RX ADMIN — IPRATROPIUM BROMIDE AND ALBUTEROL SULFATE 1 AMPULE: .5; 3 SOLUTION RESPIRATORY (INHALATION) at 12:49

## 2017-10-30 RX ADMIN — LISINOPRIL 5 MG: 5 TABLET ORAL at 08:52

## 2017-10-30 RX ADMIN — FOLIC ACID 1 MG: 1 TABLET ORAL at 08:52

## 2017-10-30 RX ADMIN — PREDNISONE 40 MG: 20 TABLET ORAL at 08:52

## 2017-10-30 RX ADMIN — DOXYCYCLINE HYCLATE 100 MG: 100 TABLET, COATED ORAL at 08:52

## 2017-10-30 ASSESSMENT — PAIN SCALES - GENERAL
PAINLEVEL_OUTOF10: 0

## 2017-10-30 NOTE — DISCHARGE INSTR - DIET
 Good nutrition is important when healing from an illness, injury, or surgery. Follow any nutrition recommendations given to you during your hospital stay.  If you were given an oral nutrition supplement while in the hospital, continue to take this supplement at home. You can take it with meals, in-between meals, and/or before bedtime. These supplements can be purchased at most local grocery stores, pharmacies, and chain super-stores.  If you have any questions about your diet or nutrition, call the hospital and ask for the dietitian. Natalia ORTIZ CARDIAC; Daily Fluid Restriction: 2000 ml    Healthy Eating habits help your heart health. Below are general guidlelines for heart healthy choices:    Eat fruits and vegetables. They are loaded with vitamins, minerals and fiber. Eat a variety every day. .   Eat a variety of whole grain products every day. They contain a lot of fiber and nutrients. Examples of whole grains include oats, whole wheat bread, and brown rice. Eat fish at least 2 times each week. Oily fish, which contain omega-3 fatty acids, are best for your heart. These fish include tuna, salmon, mackerel, lake trout, herring, and sardines. Limit saturated fat and cholesterol. To limit saturated fats and cholesterol, try to choose the following foods:   Lean meats and meat alternatives (chicken, fish, turkey, beans, and nuts)  Nonfat and low-fat dairy products (skim or 1% milk, low fat yogurt)  Unsaturated fats, like canola and olive oils instead of saturated fats, such as butter  Read food labels and limit the amount of trans fat you eat. Trans fat raises cholesterol. It is found in many processed foods made with shortening or with partially hydrogenated vegetable oils. These foods include cookies, crackers, chips, and many snack foods. Choose snacks with \"0\" grams of Trans fat. Choose healthy fats. Unsaturated fats, such as olive, canola and peanut oils, and nuts are part of a healthy diet.  But

## 2017-10-30 NOTE — PROGRESS NOTES
[facility]    Patient - Christopher Francois   MRN -  [id number]   [facility]   - 1955      Date of Admission -  10/25/2017  6:04 PM  Date of evaluation -  10/30/2017  Room - [address] [facility] [address], DO Primary Care Physician - Kimberli Pyle CNP   Chief Complaint/Reason for Consult   Following for pneumonia and pulmonary embolism  CC: none, states feels really good   Active Hospital Problem List      Active Hospital Problems    Diagnosis Date Noted    PE (pulmonary thromboembolism) ([facility] [I26.99] 10/25/2017    Pneumonia due to organism [J18.9] 10/25/2017    Sepsis ([facility] [A41.9] 10/25/2017    COPD exacerbation ([facility] [J44.1] 10/25/2017     HPI   Christopher Francois is a 60 y.o. male admitted for chest pain and shortness of breath. 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 Patient reports that he has had persistent left chest pain that started on 10/23/17 after eating \"bad chicken wing\". He describes the pain as worsening over time as a constant ache. Patient is currently residing at [facility]. Currently smokes 2 PPD for past 50 years.  Patient denies having been treated for breathing issues in the past, denies taking any inhalers, or following with any pulmonologist in the past.   Past 24 Hours   -on room air  -no SOB, no Chest pain  -denies hemoptysis  -tolerating Grace Sole  -feels good  -taking in PO well  All other systems reviewed  Meds    Current Medications    predniSONE  40 mg Oral Daily    rivaroxaban  15 mg Oral BID WC    Followed by   Arnaldo Porter ON 2017] rivaroxaban  20 mg Oral Daily    influenza virus vaccine  0.5 mL Intramuscular Once    potassium (CARDIAC) replacement protocol   Other RX Placeholder    magnesium replacement protocol   Other RX Placeholder    doxycycline hyclate  100 mg with discharge from pulmonary standpoint  -oral Doxycycline x 4 days, and PO Ceftin 500 mg BID x 5 days at discharge  -Xarelto script for discharge 15 mg PO BID x 21 days then 20 mg daily (needs 3 months anticoagulation)  -BC growing gram neg bacilli- on appropriate antibiotics  -follow pending cultures   -Off Oxygen, no home O2 eval needed  -Prednisone 40 mg PO daily x 5 days - has 2 days left of treatment- script written for discharge    -F/U with SOFIA Bradshaw or Dr David Jose in 2-3 months with PA and lat CXR and PFT's before visit (placed in Epic)    Case discussed with nurse and patient. Questions and concerns addressed. Meds and Orders reviewed. Electronically signed by   Leonidas Leonard CNP on 10/30/2017 at 10:44 AM    Addendum by Dr. David Jose MD:  I have seen and examined the patient independently. Face to face evaluation and examination was performed. The above evaluation and note has been reviewed. Labs and radiographs were reviewed. I Have discussed with Ms. Nicol Ignacio CNP about this patient in detail. D/w Patient's R.N. and specific instructions given. Patient issues addressed. The above assessment and plan has been reviewed. Please see my modifications mentioned below. My modifications:  He is on room air.   Discharge plan as above    Dominga Tsang MD 10/30/2017 6:43 PM

## 2017-10-30 NOTE — CARE COORDINATION
10/27/17, 8:50 AM      Sol Johnson day: 2  Location: [facility]/014A Reason for admit: Pulmonary embolism, bilateral ([address] [I26.99]  Pulmonary embolism, bilateral ([address] [I26.99]   Procedure: 10-26-17 Echo  Treatment Plan of Care: Heparin gtt continues, APTT's. O2 now off. No DVT BLE per dopplers. Following labs. Cont with resp txs. Continue to monitor, treat and protect skin integrity. Follow BP closely. Telemetry monitoring continues. Core Measures: VTE and pneumonia  PCP: Marisol Verde CNP  Discharge Plan: Plans home alone to [facility].
10/30/17, 4:08 PM  Patient is discharged today to home. Referral was made to [facility]  Discharge plan discussed by  and . Discharge plan reviewed with patient/ family. Patient/ family verbalize understanding of discharge plan and are in agreement with plan. Understanding was demonstrated using the teach back method.      Services After Discharge  Services At/After Discharge: Cj , Skilled Therapy (lsw)
been smoking Cigarettes. He has been smoking about 2.00 packs per day. He has never used smokeless tobacco.   Influenza Vaccination Screening Completed: yes, ordered  Pneumonia Vaccination Screening Completed: no, reviewed core measure, updated Ruiz Dunn RN  Core measures: monitor for Pneumonia Documentation-met, [employer] Education given to Billy Leslie RN, CHF-met  PCP: client chose Marcella Herrera CNP (see AVS) after list of choices offered  Readmission:   none  Risk Score: 22.5     Discharge Planning  Current Residence:  Other (Comment) ([place])  Living Arrangements:  Alone   Support Systems:  Family Members  Current Services PTA:     Potential Assistance Needed:  N/A  Potential Assistance Purchasing Medications:  No  Does patient want to participate in local refill/ meds to beds program?  No  Type of Home Care Services:  None  Patient [facility] to be discharged to:  [facility]  Expected Discharge date:  11/02/17  Follow Up Appointment: Best Day/ Time: Monday AM    Discharge Plan: met with client; plans home alone @ [facility], home SW; current with Krystal Bazzi Evaluation: yes  Update:  10/30/17   1506     10/30/17, 12:20 PM    Discharge plan discussed by  and . Discharge plan reviewed with patient/ family. Patient/ family verbalize understanding of discharge plan and are in agreement with plan. Understanding was demonstrated using the teach back method.

## 2017-10-30 NOTE — PLAN OF CARE
Problem: Falls - Risk of  Goal: Absence of falls  Outcome: Ongoing  No falls this shift. Pt up with standby assist.     Problem: Risk for Impaired Skin Integrity  Goal: Tissue integrity - skin and mucous membranes  Structural intactness and normal physiological function of skin and  mucous membranes. Outcome: Ongoing  No new skin issues this shift. Encourage pt to turn in bed.      Problem: Pain Control  Goal: Maintain pain level at or below patient's acceptable level (or 5 if patient is unable to determine acceptable level)  Outcome: Met This Shift  Pt denies pain this shift     Problem: Cardiovascular  Goal: No DVT, peripheral vascular complications  Outcome: Ongoing  No signs of DVT this shift. Goal: Hemodynamic stability  Outcome: Ongoing  Continue to monitor     Problem: Respiratory  Goal: No pulmonary complications  Outcome: Ongoing  Home O2 eval pending for discharge  Goal: O2 Sat > 90%  Outcome: Ongoing  Pt O2 sats drop to 86-89% with activity  Goal: Agreement to quit smoking  Outcome: Ongoing  Pt states he has can't wait to get out of here to smoke.     Problem: Discharge Planning:  Goal: Discharged to appropriate level of care  Discharged to appropriate level of care   Outcome: Ongoing  Plans to go home when stable discharge tomorrow. Comments: Care plan reviewed with patient. Patient verbalize understanding of the plan of care and contribute to goal setting.

## 2017-10-30 NOTE — PROGRESS NOTES
Spoke with pt regarding discharge needs and how to prevent readmission. Pt states he feels great and denies needs. When asked if he needs O2 at home, she states \"no, I'll be fine\". When asked about home health services, he also declined. Asked pt about DPOA. He states he will talk to his brother regarding that and discuss with him his wished for health care. Encouraged him to get DPOA and Living Will papers completed. (Brother lives in [place]) [place] \"I'm just ready to get out of here\". He plans on returning home alone.

## 2017-10-30 NOTE — FLOWSHEET NOTE
10/30/17 1245   Provider Notification   Reason for Communication Evaluate  (Update On Chest X-ray Results)   Provider Name Linh Pritchard CNP   Provider Notification Nurse Practitioner   Method of Communication Call   Response No new orders   Notification Time 1250     Updated on results of chest x-ray. No new orders. Follow up in 3 months.

## 2017-10-31 LAB — BLOOD CULTURE, ROUTINE: NORMAL

## 2017-11-01 LAB
BLOOD CULTURE, ROUTINE: ABNORMAL
ORGANISM: ABNORMAL

## 2017-11-15 ENCOUNTER — HOSPITAL ENCOUNTER (EMERGENCY)
Dept: EMERGENCY DEPT | Age: 62
Discharge: HOME | End: 2017-11-15
Attending: EMERGENCY MEDICINE
Payer: MEDICAID

## 2017-11-15 ENCOUNTER — APPOINTMENT (OUTPATIENT)
Dept: CT IMAGING | Age: 62
End: 2017-11-15
Payer: MEDICAID

## 2017-11-15 ENCOUNTER — APPOINTMENT (OUTPATIENT)
Dept: INTERVENTIONAL RADIOLOGY/VASCULAR | Age: 62
End: 2017-11-15
Payer: MEDICAID

## 2017-11-15 ENCOUNTER — APPOINTMENT (OUTPATIENT)
Dept: GENERAL RADIOLOGY | Age: 62
End: 2017-11-15
Payer: MEDICAID

## 2017-11-15 VITALS
DIASTOLIC BLOOD PRESSURE: 56 MMHG | OXYGEN SATURATION: 97 % | BODY MASS INDEX: 27.66 KG/M2 | SYSTOLIC BLOOD PRESSURE: 152 MMHG | HEART RATE: 92 BPM | WEIGHT: 162 LBS | TEMPERATURE: 97.9 F | RESPIRATION RATE: 22 BRPM | HEIGHT: 64 IN

## 2017-11-15 DIAGNOSIS — M79.89 LEG SWELLING: ICD-10-CM

## 2017-11-15 DIAGNOSIS — J44.9 CHRONIC OBSTRUCTIVE PULMONARY DISEASE, UNSPECIFIED COPD TYPE (HCC): ICD-10-CM

## 2017-11-15 DIAGNOSIS — J18.9 PNEUMONIA DUE TO ORGANISM: Primary | ICD-10-CM

## 2017-11-15 DIAGNOSIS — Z86.711 HISTORY OF PULMONARY EMBOLISM: ICD-10-CM

## 2017-11-15 LAB
ALBUMIN SERPL-MCNC: 2.9 G/DL (ref 3.5–5.1)
ALP BLD-CCNC: 72 U/L (ref 38–126)
ALT SERPL-CCNC: 11 U/L (ref 11–66)
ANION GAP SERPL CALCULATED.3IONS-SCNC: 10 MEQ/L (ref 8–16)
ANISOCYTOSIS: ABNORMAL
AST SERPL-CCNC: 11 U/L (ref 5–40)
BASOPHILS # BLD: 1.3 %
BASOPHILS ABSOLUTE: 0.1 THOU/MM3 (ref 0–0.1)
BILIRUB SERPL-MCNC: 0.2 MG/DL (ref 0.3–1.2)
BILIRUBIN DIRECT: < 0.2 MG/DL (ref 0–0.3)
BUN BLDV-MCNC: 9 MG/DL (ref 7–22)
C-REACTIVE PROTEIN: 6.73 MG/DL (ref 0–1)
CALCIUM SERPL-MCNC: 9 MG/DL (ref 8.5–10.5)
CHLORIDE BLD-SCNC: 97 MEQ/L (ref 98–111)
CO2: 29 MEQ/L (ref 23–33)
CREAT SERPL-MCNC: 0.5 MG/DL (ref 0.4–1.2)
D-DIMER QUANTITATIVE: 1515 NG/ML FEU (ref 0–500)
EKG ATRIAL RATE: 82 BPM
EKG P AXIS: 40 DEGREES
EKG P-R INTERVAL: 134 MS
EKG Q-T INTERVAL: 400 MS
EKG QRS DURATION: 96 MS
EKG QTC CALCULATION (BAZETT): 467 MS
EKG R AXIS: 41 DEGREES
EKG T AXIS: 97 DEGREES
EKG VENTRICULAR RATE: 82 BPM
EOSINOPHIL # BLD: 2.7 %
EOSINOPHILS ABSOLUTE: 0.2 THOU/MM3 (ref 0–0.4)
GFR SERPL CREATININE-BSD FRML MDRD: > 90 ML/MIN/1.73M2
GLUCOSE BLD-MCNC: 124 MG/DL (ref 70–108)
HCT VFR BLD CALC: 34.5 % (ref 42–52)
HEMOGLOBIN: 11.9 GM/DL (ref 14–18)
LIPASE: 31.2 U/L (ref 5.6–51.3)
LYMPHOCYTES # BLD: 18 %
LYMPHOCYTES ABSOLUTE: 1 THOU/MM3 (ref 1–4.8)
MAGNESIUM: 2 MG/DL (ref 1.6–2.4)
MCH RBC QN AUTO: 31.7 PG (ref 27–31)
MCHC RBC AUTO-ENTMCNC: 34.6 GM/DL (ref 33–37)
MCV RBC AUTO: 91.6 FL (ref 80–94)
MONOCYTES # BLD: 11.1 %
MONOCYTES ABSOLUTE: 0.6 THOU/MM3 (ref 0.4–1.3)
NUCLEATED RED BLOOD CELLS: 0 /100 WBC
OSMOLALITY CALCULATION: 272.1 MOSMOL/KG (ref 275–300)
PDW BLD-RTO: 15.6 % (ref 11.5–14.5)
PLATELET # BLD: 534 THOU/MM3 (ref 130–400)
PMV BLD AUTO: 7.6 MCM (ref 7.4–10.4)
POTASSIUM SERPL-SCNC: 4.1 MEQ/L (ref 3.5–5.2)
PRO-BNP: 3691 PG/ML (ref 0–900)
RBC # BLD: 3.76 MILL/MM3 (ref 4.7–6.1)
SEDIMENTATION RATE, ERYTHROCYTE: 105 MM/HR (ref 0–10)
SEG NEUTROPHILS: 66.9 %
SEGMENTED NEUTROPHILS ABSOLUTE COUNT: 3.8 THOU/MM3 (ref 1.8–7.7)
SODIUM BLD-SCNC: 136 MEQ/L (ref 135–145)
TOTAL PROTEIN: 6.6 G/DL (ref 6.1–8)
TROPONIN T: < 0.01 NG/ML
TSH SERPL DL<=0.05 MIU/L-ACNC: 0.97 UIU/ML (ref 0.4–4.2)
URIC ACID: 3.5 MG/DL (ref 3.7–7)
WBC # BLD: 5.7 THOU/MM3 (ref 4.8–10.8)

## 2017-11-15 PROCEDURE — 85025 COMPLETE CBC W/AUTO DIFF WBC: CPT

## 2017-11-15 PROCEDURE — 99285 EMERGENCY DEPT VISIT HI MDM: CPT

## 2017-11-15 PROCEDURE — 84484 ASSAY OF TROPONIN QUANT: CPT

## 2017-11-15 PROCEDURE — 86038 ANTINUCLEAR ANTIBODIES: CPT

## 2017-11-15 PROCEDURE — 71275 CT ANGIOGRAPHY CHEST: CPT

## 2017-11-15 PROCEDURE — 86039 ANTINUCLEAR ANTIBODIES (ANA): CPT

## 2017-11-15 PROCEDURE — 2580000003 HC RX 258: Performed by: EMERGENCY MEDICINE

## 2017-11-15 PROCEDURE — 84443 ASSAY THYROID STIM HORMONE: CPT

## 2017-11-15 PROCEDURE — 96365 THER/PROPH/DIAG IV INF INIT: CPT

## 2017-11-15 PROCEDURE — 83880 ASSAY OF NATRIURETIC PEPTIDE: CPT

## 2017-11-15 PROCEDURE — 86140 C-REACTIVE PROTEIN: CPT

## 2017-11-15 PROCEDURE — 85379 FIBRIN DEGRADATION QUANT: CPT

## 2017-11-15 PROCEDURE — 93005 ELECTROCARDIOGRAM TRACING: CPT

## 2017-11-15 PROCEDURE — 36415 COLL VENOUS BLD VENIPUNCTURE: CPT

## 2017-11-15 PROCEDURE — 71020 XR CHEST STANDARD TWO VW: CPT

## 2017-11-15 PROCEDURE — 84550 ASSAY OF BLOOD/URIC ACID: CPT

## 2017-11-15 PROCEDURE — 85651 RBC SED RATE NONAUTOMATED: CPT

## 2017-11-15 PROCEDURE — 80053 COMPREHEN METABOLIC PANEL: CPT

## 2017-11-15 PROCEDURE — 82248 BILIRUBIN DIRECT: CPT

## 2017-11-15 PROCEDURE — 6360000004 HC RX CONTRAST MEDICATION: Performed by: EMERGENCY MEDICINE

## 2017-11-15 PROCEDURE — 6360000002 HC RX W HCPCS: Performed by: EMERGENCY MEDICINE

## 2017-11-15 PROCEDURE — 83690 ASSAY OF LIPASE: CPT

## 2017-11-15 PROCEDURE — 83735 ASSAY OF MAGNESIUM: CPT

## 2017-11-15 PROCEDURE — 93970 EXTREMITY STUDY: CPT

## 2017-11-15 RX ORDER — LEVOFLOXACIN 5 MG/ML
500 INJECTION, SOLUTION INTRAVENOUS ONCE
Status: COMPLETED | OUTPATIENT
Start: 2017-11-15 | End: 2017-11-15

## 2017-11-15 RX ORDER — SODIUM CHLORIDE 9 MG/ML
INJECTION, SOLUTION INTRAVENOUS CONTINUOUS
Status: DISCONTINUED | OUTPATIENT
Start: 2017-11-15 | End: 2017-11-15 | Stop reason: HOSPADM

## 2017-11-15 RX ORDER — LEVOFLOXACIN 500 MG/1
500 TABLET, FILM COATED ORAL DAILY
Qty: 10 TABLET | Refills: 0 | Status: SHIPPED | OUTPATIENT
Start: 2017-11-15 | End: 2017-11-25

## 2017-11-15 RX ORDER — PREDNISONE 10 MG/1
TABLET ORAL
Qty: 16 TABLET | Refills: 0 | Status: ON HOLD | OUTPATIENT
Start: 2017-11-15 | End: 2019-01-05 | Stop reason: HOSPADM

## 2017-11-15 RX ADMIN — SODIUM CHLORIDE: 9 INJECTION, SOLUTION INTRAVENOUS at 13:40

## 2017-11-15 RX ADMIN — IOPAMIDOL 85 ML: 755 INJECTION, SOLUTION INTRAVENOUS at 15:09

## 2017-11-15 RX ADMIN — LEVOFLOXACIN 500 MG: 5 INJECTION, SOLUTION INTRAVENOUS at 17:12

## 2017-11-15 ASSESSMENT — ENCOUNTER SYMPTOMS
VOMITING: 0
WHEEZING: 0
ABDOMINAL PAIN: 0
NAUSEA: 0
COUGH: 0
SHORTNESS OF BREATH: 1
EYE DISCHARGE: 0
EYE REDNESS: 0
DIARRHEA: 0
BACK PAIN: 0
RHINORRHEA: 0
SORE THROAT: 0

## 2017-11-15 NOTE — ED NOTES
Pt resting in bed. Resp easy and regular. Denies all needs. Call light in reach.       Pawel Izaguirre RN  11/15/17 [id number]

## 2017-11-15 NOTE — ED NOTES
Pt medicated per orders. Resp easy and regular. Denies all needs. Call light in reach.      Jessie Garcia RN  11/15/17 1953

## 2017-11-15 NOTE — ED PROVIDER NOTES
[address] [address]   eMERGENCY dEPARTMENT eNCOUnter        [facility]    Chief Complaint   Patient presents with    Shortness of Breath    Leg Swelling    Nausea       Nurses Notes reviewed and I agree except as noted in the HPI. HPI    Adriel Albrecht is a 60 y.o. male who presents for evaluation of bilateral leg swelling, nausea, shortness of breath and fatigue. The patient states that he has been feeling fatigue and says \"something isn't right\" so he decided he should come get checked out. He says that he hasn't had an appetite, he has been fatigue, weak and his ankles started to swell bilaterally about 3 days ago. He says that he can only walk about 30-40 ft and then he begins to feel out of breath. The patient reports that he currently smokes 1 pack of cigarettes a day. He denies any chest pain, palpitations or vomiting. REVIEW OF SYSTEMS    Review of Systems   Constitutional: Positive for appetite change and fatigue. Negative for chills and fever. HENT: Negative for congestion, ear pain, rhinorrhea and sore throat. Eyes: Negative for discharge, redness and visual disturbance. Respiratory: Positive for shortness of breath. Negative for cough and wheezing. Cardiovascular: Positive for leg swelling. Negative for chest pain and palpitations. Gastrointestinal: Negative for abdominal pain, diarrhea, nausea and vomiting. Genitourinary: Negative for decreased urine volume, difficulty urinating and dysuria. Musculoskeletal: Negative for arthralgias, back pain, joint swelling and neck pain. Skin: Negative for pallor and rash. Allergic/Immunologic: Negative for environmental allergies. Neurological: Positive for weakness. Negative for dizziness, syncope, light-headedness and headaches. Hematological: Negative for adenopathy. Psychiatric/Behavioral: Negative for agitation, confusion, dysphoric mood and suicidal ideas.  The patient is not nervous/anxious. PAST MEDICAL HISTORY     has a past medical history of Brain tumor [facility]); COPD (chronic obstructive pulmonary disease) ([facility] [address] Coronary artery disease involving native coronary artery of native heart without angina pectoris; Essential hypertension; and S/P cardiac catheterization: 3/27/2017: 40-50% mid-LAD, 60-70% mid-Ramus with FFR of 0.9, and 50% mid-RCA. LVEF 25-30%. Severe AI 3+. .    SURGICAL HISTORY     has a past surgical history that includes Colonoscopy and brain surgery. CURRENT MEDICATIONS    Previous Medications    ASPIRIN 325 MG EC TABLET    Take 1 tablet by mouth daily    CARVEDILOL (COREG) 3.125 MG TABLET    Take 1 tablet by mouth 2 times daily (with meals)    DIGOXIN (LANOXIN) 125 MCG TABLET    Take 1 tablet by mouth daily    FOLIC ACID (FOLVITE) 1 MG TABLET    Take 1 tablet by mouth daily    LISINOPRIL (PRINIVIL;ZESTRIL) 5 MG TABLET    Take 1 tablet by mouth daily    MULTIPLE VITAMIN (MULTIVITAMIN) TABLET    Take 1 tablet by mouth daily    RIVAROXABAN (XARELTO) 15 MG TABS TABLET    Take 1 tablet by mouth 2 times daily (with meals) for 19 days    RIVAROXABAN (XARELTO) 20 MG TABS TABLET    Take 1 tablet by mouth daily    VITAMIN B-1 100 MG TABLET    Take 1 tablet by mouth daily       ALLERGIES    has No Known Allergies. FAMILY HISTORY    indicated that the status of his mother is unknown. He indicated that the status of his father is unknown. He indicated that the status of his brother is unknown.    family history includes Cancer in his father and mother; Diabetes in his brother. SOCIAL HISTORY     reports that he has been smoking Cigarettes. He has been smoking about 2.00 packs per day. He has never used smokeless tobacco. He reports that he drinks about 16.8 oz of alcohol per week . He reports that he does not use drugs.     PHYSICAL EXAM      INITIAL VITALS: BP (!) 152/56   Pulse 92   Temp 97.9 °F (36.6 °C) (Oral)   Resp 22   Ht 5' 4\" (1.626 m)   Wt 162 lb electronically signed by Dr. Rachna Tucker on 11/15/2017 3:47 PM      XR CHEST STANDARD (2 VW)   Final Result   1. Borderline heart size. No effusion is seen. 2. Mild residual pneumonia left perihilar region, improved from prior study. . There is no evidence for pneumonic abscesses. **This report has been created using voice recognition software. It may contain minor errors which are inherent in voice recognition technology. **      Final report electronically signed by Dr. Rachna Tucker on 11/15/2017 2:20 PM          LABS:   Labs Reviewed   CBC WITH AUTO DIFFERENTIAL - Abnormal; Notable for the following:        Result Value    RBC 3.76 (*)     Hemoglobin 11.9 (*)     Hematocrit 34.5 (*)     MCH 31.7 (*)     RDW 15.6 (*)     Platelets 370 (*)     All other components within normal limits   BASIC METABOLIC PANEL - Abnormal; Notable for the following:     Chloride 97 (*)     Glucose 124 (*)     All other components within normal limits   HEPATIC FUNCTION PANEL - Abnormal; Notable for the following:     Alb 2.9 (*)     Total Bilirubin 0.2 (*)     All other components within normal limits   C-REACTIVE PROTEIN - Abnormal; Notable for the following:     CRP 6.73 (*)     All other components within normal limits   SEDIMENTATION RATE - Abnormal; Notable for the following:     Sed Rate 105 (*)     All other components within normal limits   D-DIMER, QUANTITATIVE - Abnormal; Notable for the following:     D-Dimer, Quant 1515.00 (*)     All other components within normal limits   BRAIN NATRIURETIC PEPTIDE - Abnormal; Notable for the following:     Pro-BNP 3691.0 (*)     All other components within normal limits   OSMOLALITY - Abnormal; Notable for the following:     Osmolality Calc 272.1 (*)     All other components within normal limits   LIPASE   TROPONIN   MAGNESIUM   TSH WITH REFLEX   GLOMERULAR FILTRATION RATE, ESTIMATED   ANION GAP   SHARON SCREEN WITH REFLEX   RA LATEX SCREEN   URIC ACID     All other unresulted laboratory test above are normal:    Vitals:    Vitals:    11/15/17 1504 11/15/17 1615 11/15/17 1713 11/15/17 1817   BP: (!) 163/58 (!) 160/56 (!) 160/54 (!) 152/56   Pulse: 91 90 99 92   Resp: 23 22 22 22   Temp:       TempSrc:       SpO2: 95% 96% 97% 97%   Weight:       Height:           EMERGENCY DEPARTMENT COURSE:    Medications   0.9 % sodium chloride infusion ( Intravenous New Bag 11/15/17 1340)   iopamidol (ISOVUE-370) 76 % injection 85 mL (85 mLs Intravenous Given 11/15/17 1509)   levofloxacin (LEVAQUIN) 500 MG/100ML infusion 500 mg (0 mg Intravenous Stopped 11/15/17 1838)       The pt was seen and evaluated by me. Within the department, I observed the pt's vital signs to be within acceptable range. Laboratory and Radiological studies were performed, results were reviewed with the patient. Within the department, the pt was treated with IV fluids and IV Levaquin. I observed the pt's condition to stablize during the duration of their stay. I explained my proposed course of treatment to the pt, and they were amenable to my decision. They were discharged home, and they will return to the ED if their symptoms become more severe in nature, or otherwise change. CRITICAL CARE:   None. CONSULTS:  LEVY Fredy Bellamy but cannot get hold of her    PROCEDURES:  None. FINAL IMPRESSION       1. Pneumonia due to organism    2. Chronic obstructive pulmonary disease, unspecified COPD type (HCC)    3. Leg swelling bilateral    4.  History of pulmonary embolism          DISPOSITION/PLAN  PATIENT REFERRED TO:  Joe Smith NP  [address] [facility] [phone]    In 2 days      [facility] EMERGENCY DEPT  [address]  [phone]    As needed, If symptoms worsen    DISCHARGE MEDICATIONS:  New Prescriptions    LEVOFLOXACIN (LEVAQUIN) 500 MG TABLET    Take 1 tablet by mouth daily for 10 days    PREDNISONE (DELTASONE) 10 MG TABLET    2 tablets 2 times a day for 2 days then 1 Tablet 2 times a day for 2 days, then 1 tablet daily till gone       (Please note that portions of this note were completed with a voice recognition program.  Efforts were made to edit the dictations but occasionally words are mis-transcribed.)      Scribe:  Radha Toure 11/15/17 1:29 PM Scribing for and in the presence of Ana Guzman M.D. Signed by: Vin Strauss, 11/15/17 6:38 PM    Provider:  I personally performed the services described in the documentation, reviewed and edited the documentation which was dictated to the scribe in my presence, and it accurately records my words and actions.      11/15/17 6:38 PM      Amalia Cardoso MD      Emergency room physician        Amalia Cardoso MD  11/22/17 7087

## 2017-11-15 NOTE — ED NOTES
Pt lying in bed, watching tv. Resp easy and regular. Denies all needs. Updated on POC. Verbalized understanding. Call light in reach;.      Pawel Izaguirre RN  11/15/17 8252

## 2017-11-18 LAB — ANA SCREEN: DETECTED

## 2017-11-20 LAB — ANA SCREEN, REFLEXED: ABNORMAL

## 2018-02-01 ENCOUNTER — TELEPHONE (OUTPATIENT)
Dept: CARDIOLOGY CLINIC | Age: 63
End: 2018-02-01

## 2018-02-01 NOTE — TELEPHONE ENCOUNTER
No show 2/1/2018. Could not leave message-voicemail full. Notified referring physician -Edward Bell [phone]- of missed appointment.

## 2018-02-05 ENCOUNTER — TELEPHONE (OUTPATIENT)
Dept: PULMONOLOGY | Age: 63
End: 2018-02-05

## 2018-03-08 NOTE — DISCHARGE SUMMARY
Discharge Summary     Patient Identification:  Diego Guzman  :   MRN: [id number]   Account: [de-identified]     Admit date: 10/25/2017  Discharge date: 10/30/17   Attending provider: No att. providers found        Primary care provider: Tiarra Navarro CNP     Discharge Diagnoses: Active Problems:    PE (pulmonary thromboembolism) ([facility] [address]    Pneumonia due to organism    Sepsis ([facility] [address]    COPD exacerbation ([facility] [address]  Resolved Problems:    * No resolved hospital problems. [facility] Course:   Per previous note: Pt with psychiatric history, as well as history of medical non-compliance, admitted with PE , pneumonia on Rocephin & Doxycycline, possible sepsis, has been on Heparin gtt, notable severe aortic regurge, cardiology following. Pt has declined surgery, but is willing to follow up with cardiology and continue medications after discharge.     He denies C/P, SOB, NVD. He states he has been drinking 2-3 L of water, plus fluids with trays. States \"I thought drinking a lot of water was good for you\". Is interested in seeing what options are available to him other than surgery.      Subjective: 10/29/17 - Again denies complaints, says he feels well  Plan:  1. Pulmonary embolus, on Xarelto, unclear etiology  - States SOB improving daily  - Continue OAC.   - BMP in am.      2. Hyponatremia, likely secondary to avid water intake, drinking approx 3 liters of water, plus additional fluids on trays  - 2 liter fluid restriction  - Educated pt, verbalizes understanding. 10/29/17 - Better today     3. Mild hyperkalemia, discontinue potassium supplementation. 4. Pneumonia on Doxy and Rocephin  - Strep pneumoniae and legionella negative, will send out on just Doxy to complete 7 days      5.  COPD/smoker/SOB, on Methyprednisone, on oral Prednisone 40 mg daily, not requiring O2.   6. Diastolic dysfunction with EF 25-30%, on lisinopril, dig, ASA  - cardiology on case, appreciate input concerning options for AR     Discharge tomorrow with SW help in trying to set up some assistance or care for him        Discharge Medications:   Girish Toribio   Home Medication Instructions NAP:[id number]    Printed on:03/07/18 2320   Medication Information                      aspirin 325 MG EC tablet  Take 1 tablet by mouth daily             carvedilol (COREG) 3.125 MG tablet  Take 1 tablet by mouth 2 times daily (with meals)             digoxin (LANOXIN) 125 MCG tablet  Take 1 tablet by mouth daily             folic acid (FOLVITE) 1 MG tablet  Take 1 tablet by mouth daily             lisinopril (PRINIVIL;ZESTRIL) 5 MG tablet  Take 1 tablet by mouth daily             Multiple Vitamin (MULTIVITAMIN) tablet  Take 1 tablet by mouth daily             rivaroxaban (XARELTO) 15 MG TABS tablet  Take 1 tablet by mouth 2 times daily (with meals) for 19 days             rivaroxaban (XARELTO) 20 MG TABS tablet  Take 1 tablet by mouth daily             vitamin B-1 100 MG tablet  Take 1 tablet by mouth daily                 Patient Instructions:    Discharge lab work: Activity: activity as tolerated  Diet:      Code Status: Prior    Follow-up visits:   Sarahy Becerril  [address] [address]  [phone]      As needed    Sebastian Acosta, CNP  [address]  [facility].  [address] [facility]  [address] [place] [address]    Go on 2/5/2018  Pulmonology, Your appointment time is at 2:00 p.m., Please arrive 15 minutes prior to appointment time, Bring medications, photo idea, and insurance card    [facility] [id number]  [phone]    Go on 11/10/2017  Your appointment time is 10:45 a.m., please arrive 15 minutes early, bring photo ID, insurance card, medications    Enedelia Hughes, [address] [address] [address]  [phone]    On 12/11/2017  Cardiology, Your appointment time is at 2:30 p.m.,, Please arrive 15 minutes prior to appointment time, Bring medications, photo idea, and insurance card    [facility] COMPARISON: 10/30/2017 TECHNIQUE: PA and lateral views of the chest were obtained. 1. Borderline heart size. No effusion is seen. 2. Mild residual pneumonia left perihilar region, improved from prior study. . There is no evidence for pneumonic abscesses. **This report has been created using voice recognition software. It may contain minor errors which are inherent in voice recognition technology. ** Final report electronically signed by Dr. Anupam Pham on 11/15/2017 2:20 PM    Cta Chest W Wo Contrast    Result Date: 11/15/2017  CTA THORAX / PULMONARY EMBOLUS STUDY: CLINICAL INFORMATION: shortness of breath with elevated d-dimer COMPARISON; 10/25/2017 TECHNIQUE: 1.5 mm axial images were obtained through the chest following the administration of IV contrast (ISOVUE). A non-contrast localizer was obtained. 3D reconstructions were performed on the scanner to include oblique coronal MIP images through both the right and left pulmonary arteries. ALL CT SCANS AT THIS FACILITY use dose modulation, iterative reconstruction, and/or weight-based dosing when appropriate to reduce radiation dose to as low as reasonably achievable. FINDINGS: Upper abdomen: Small hiatus hernia. Mediastinum and cookie: There are a few small mediastinal lymph nodes, likely postinflammatory. No hilar adenopathy is seen. 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 Again noted is an area of dense consolidation in the left lower lobe, consistent with pneumonia, slightly improved from prior study. No effusion is seen. Vascular: No pulmonary emboli are seen. There is no large vessel aneurysm or dissection. The previously noted pulmonary emboli on the prior study have resolved. 1.  No pulmonary emboli are seen. The previously noted pulmonary emboli on the prior study have resolved.  2. Persistent left lower lobe pneumonia, slightly improved from prior study. 3. Severely fibroemphysematous lungs. Small hiatus hernia. **This report has been created using voice recognition software. It may contain minor errors which are inherent in voice recognition technology. **  Final report electronically signed by Dr. An Hurley on 11/15/2017 3:47 PM    Vl Dup Lower Extremity Venous Bilateral    Result Date: 11/15/2017  PROCEDURE: VL LOWER EXTREMITY BILATERAL VENOUS DUPLEX CLINICAL INFORMATION: shortness of breath with swollen legs bilateral, COMPARISON: No prior study. TECHNIQUE: Multiple grayscale and color flow images of the major veins of both lower extremities were obtained from the level of the groin to the level of the ankle. Multiple compression and augmentation maneuvers were performed. FINDINGS: RIGHT LOWER EXTREMITY:All the deep veins of the right lower extremity are widely patent with normal flow and normal compressibility. LEFT LOWER EXTREMITY:All the  deep veins of the left are widely patent with normal flow and normal compressibility. Normal venous ultrasound. No evidence for acute deep venous thrombosis. **This report has been created using voice recognition software. It may contain minor errors which are inherent in voice recognition technology. ** Final report electronically signed by Dr. An Hurley on 11/15/2017 4:56 PM      Labs: No results found for this or any previous visit (from the past 72 hour(s)).     Discharge condition: good  Disposition: Home with [address] [place] [address]  Time spent on discharge: 20 minutes    Electronically signed by Kathe Anthony DO on 3/7/2018 at 11:20 PM

## 2019-01-04 ENCOUNTER — APPOINTMENT (OUTPATIENT)
Dept: CT IMAGING | Age: 64
End: 2019-01-04

## 2019-01-04 ENCOUNTER — HOSPITAL ENCOUNTER (OUTPATIENT)
Age: 64
Setting detail: OBSERVATION
Discharge: HOME | End: 2019-01-05
Attending: FAMILY MEDICINE | Admitting: HOSPITALIST

## 2019-01-04 DIAGNOSIS — R45.851 DEPRESSION WITH SUICIDAL IDEATION: ICD-10-CM

## 2019-01-04 DIAGNOSIS — R45.851 SUICIDAL IDEATION: ICD-10-CM

## 2019-01-04 DIAGNOSIS — F10.930 ALCOHOL WITHDRAWAL SYNDROME WITHOUT COMPLICATION (HCC): Primary | ICD-10-CM

## 2019-01-04 DIAGNOSIS — F32.A DEPRESSION WITH SUICIDAL IDEATION: ICD-10-CM

## 2019-01-04 PROBLEM — F10.939 ALCOHOL WITHDRAWAL SYNDROME WITH COMPLICATION (HCC): Status: ACTIVE | Noted: 2019-01-04

## 2019-01-04 LAB
ACETAMINOPHEN LEVEL: < 5 UG/ML (ref 0–20)
ALBUMIN SERPL-MCNC: 4.1 G/DL (ref 3.5–5.1)
ALP BLD-CCNC: 61 U/L (ref 38–126)
ALT SERPL-CCNC: 23 U/L (ref 11–66)
AMPHETAMINE+METHAMPHETAMINE URINE SCREEN: NEGATIVE
ANION GAP SERPL CALCULATED.3IONS-SCNC: 18 MEQ/L (ref 8–16)
AST SERPL-CCNC: 47 U/L (ref 5–40)
BACTERIA: ABNORMAL /HPF
BARBITURATE QUANTITATIVE URINE: NEGATIVE
BASOPHILS # BLD: 1.8 %
BASOPHILS ABSOLUTE: 0.1 THOU/MM3 (ref 0–0.1)
BENZODIAZEPINE QUANTITATIVE URINE: NEGATIVE
BILIRUB SERPL-MCNC: 0.6 MG/DL (ref 0.3–1.2)
BILIRUBIN URINE: ABNORMAL
BLOOD, URINE: ABNORMAL
BUN BLDV-MCNC: 8 MG/DL (ref 7–22)
CALCIUM SERPL-MCNC: 9.5 MG/DL (ref 8.5–10.5)
CANNABINOID QUANTITATIVE URINE: NEGATIVE
CASTS 2: ABNORMAL /LPF
CASTS UA: ABNORMAL /LPF
CHARACTER, URINE: ABNORMAL
CHLORIDE BLD-SCNC: 101 MEQ/L (ref 98–111)
CO2: 21 MEQ/L (ref 23–33)
COCAINE METABOLITE QUANTITATIVE URINE: NEGATIVE
COLOR: ABNORMAL
CREAT SERPL-MCNC: 0.7 MG/DL (ref 0.4–1.2)
CRYSTALS, UA: ABNORMAL
EKG ATRIAL RATE: 81 BPM
EKG P AXIS: 57 DEGREES
EKG P-R INTERVAL: 122 MS
EKG Q-T INTERVAL: 422 MS
EKG QRS DURATION: 110 MS
EKG QTC CALCULATION (BAZETT): 490 MS
EKG R AXIS: 60 DEGREES
EKG T AXIS: -162 DEGREES
EKG VENTRICULAR RATE: 81 BPM
EOSINOPHIL # BLD: 0.9 %
EOSINOPHILS ABSOLUTE: 0 THOU/MM3 (ref 0–0.4)
EPITHELIAL CELLS, UA: ABNORMAL /HPF
ERYTHROCYTE [DISTWIDTH] IN BLOOD BY AUTOMATED COUNT: 13.4 % (ref 11.5–14.5)
ERYTHROCYTE [DISTWIDTH] IN BLOOD BY AUTOMATED COUNT: 46 FL (ref 35–45)
ETHYL ALCOHOL, SERUM: 0.09 %
GFR SERPL CREATININE-BSD FRML MDRD: > 90 ML/MIN/1.73M2
GLUCOSE BLD-MCNC: 60 MG/DL (ref 70–108)
GLUCOSE URINE: NEGATIVE MG/DL
HCT VFR BLD CALC: 44.7 % (ref 42–52)
HEMOGLOBIN: 15.5 GM/DL (ref 14–18)
ICTOTEST: NEGATIVE
IMMATURE GRANS (ABS): 0.01 THOU/MM3 (ref 0–0.07)
IMMATURE GRANULOCYTES: 0.2 %
KETONES, URINE: ABNORMAL
LEUKOCYTE ESTERASE, URINE: NEGATIVE
LIPASE: 23.6 U/L (ref 5.6–51.3)
LYMPHOCYTES # BLD: 25.7 %
LYMPHOCYTES ABSOLUTE: 1.4 THOU/MM3 (ref 1–4.8)
MCH RBC QN AUTO: 32.4 PG (ref 26–33)
MCHC RBC AUTO-ENTMCNC: 34.7 GM/DL (ref 32.2–35.5)
MCV RBC AUTO: 93.5 FL (ref 80–94)
MISCELLANEOUS 2: ABNORMAL
MONOCYTES # BLD: 9.9 %
MONOCYTES ABSOLUTE: 0.5 THOU/MM3 (ref 0.4–1.3)
NITRITE, URINE: NEGATIVE
NUCLEATED RED BLOOD CELLS: 0 /100 WBC
OPIATES, URINE: NEGATIVE
OSMOLALITY CALCULATION: 275.6 MOSMOL/KG (ref 275–300)
OXYCODONE: NEGATIVE
PH UA: 5.5
PHENCYCLIDINE QUANTITATIVE URINE: NEGATIVE
PLATELET # BLD: 233 THOU/MM3 (ref 130–400)
PMV BLD AUTO: 9.9 FL (ref 9.4–12.4)
POTASSIUM SERPL-SCNC: 4.8 MEQ/L (ref 3.5–5.2)
PROTEIN UA: 100
RBC # BLD: 4.78 MILL/MM3 (ref 4.7–6.1)
RBC URINE: ABNORMAL /HPF
RENAL EPITHELIAL, UA: ABNORMAL
SALICYLATE, SERUM: < 0.3 MG/DL (ref 2–10)
SEG NEUTROPHILS: 61.5 %
SEGMENTED NEUTROPHILS ABSOLUTE COUNT: 3.4 THOU/MM3 (ref 1.8–7.7)
SODIUM BLD-SCNC: 140 MEQ/L (ref 135–145)
SPECIFIC GRAVITY, URINE: 1.02 (ref 1–1.03)
TOTAL PROTEIN: 7.6 G/DL (ref 6.1–8)
TROPONIN T: < 0.01 NG/ML
UROBILINOGEN, URINE: 1 EU/DL
WBC # BLD: 5.5 THOU/MM3 (ref 4.8–10.8)
WBC UA: ABNORMAL /HPF
YEAST: ABNORMAL

## 2019-01-04 PROCEDURE — 6370000000 HC RX 637 (ALT 250 FOR IP): Performed by: HOSPITALIST

## 2019-01-04 PROCEDURE — 93010 ELECTROCARDIOGRAM REPORT: CPT | Performed by: INTERNAL MEDICINE

## 2019-01-04 PROCEDURE — 2709999900 HC NON-CHARGEABLE SUPPLY

## 2019-01-04 PROCEDURE — 99285 EMERGENCY DEPT VISIT HI MDM: CPT

## 2019-01-04 PROCEDURE — 87040 BLOOD CULTURE FOR BACTERIA: CPT

## 2019-01-04 PROCEDURE — 6360000004 HC RX CONTRAST MEDICATION: Performed by: FAMILY MEDICINE

## 2019-01-04 PROCEDURE — G0378 HOSPITAL OBSERVATION PER HR: HCPCS

## 2019-01-04 PROCEDURE — 2580000003 HC RX 258: Performed by: HOSPITALIST

## 2019-01-04 PROCEDURE — 71275 CT ANGIOGRAPHY CHEST: CPT

## 2019-01-04 PROCEDURE — 93005 ELECTROCARDIOGRAM TRACING: CPT | Performed by: FAMILY MEDICINE

## 2019-01-04 PROCEDURE — 36415 COLL VENOUS BLD VENIPUNCTURE: CPT

## 2019-01-04 PROCEDURE — 81001 URINALYSIS AUTO W/SCOPE: CPT

## 2019-01-04 PROCEDURE — 80307 DRUG TEST PRSMV CHEM ANLYZR: CPT

## 2019-01-04 PROCEDURE — 6360000002 HC RX W HCPCS: Performed by: HOSPITALIST

## 2019-01-04 PROCEDURE — 80053 COMPREHEN METABOLIC PANEL: CPT

## 2019-01-04 PROCEDURE — 83690 ASSAY OF LIPASE: CPT

## 2019-01-04 PROCEDURE — G0480 DRUG TEST DEF 1-7 CLASSES: HCPCS

## 2019-01-04 PROCEDURE — 84484 ASSAY OF TROPONIN QUANT: CPT

## 2019-01-04 PROCEDURE — 99218 PR INITIAL OBSERVATION CARE/DAY 30 MINUTES: CPT | Performed by: HOSPITALIST

## 2019-01-04 PROCEDURE — 85025 COMPLETE CBC W/AUTO DIFF WBC: CPT

## 2019-01-04 PROCEDURE — 2500000003 HC RX 250 WO HCPCS: Performed by: HOSPITALIST

## 2019-01-04 RX ORDER — LORAZEPAM 2 MG/1
4 TABLET ORAL
Status: DISCONTINUED | OUTPATIENT
Start: 2019-01-04 | End: 2019-01-05 | Stop reason: HOSPADM

## 2019-01-04 RX ORDER — ONDANSETRON 2 MG/ML
4 INJECTION INTRAMUSCULAR; INTRAVENOUS EVERY 6 HOURS PRN
Status: DISCONTINUED | OUTPATIENT
Start: 2019-01-04 | End: 2019-01-05 | Stop reason: HOSPADM

## 2019-01-04 RX ORDER — DIGOXIN 125 MCG
125 TABLET ORAL DAILY
Status: DISCONTINUED | OUTPATIENT
Start: 2019-01-04 | End: 2019-01-05 | Stop reason: HOSPADM

## 2019-01-04 RX ORDER — LORAZEPAM 2 MG/ML
1 INJECTION INTRAMUSCULAR
Status: DISCONTINUED | OUTPATIENT
Start: 2019-01-04 | End: 2019-01-05 | Stop reason: HOSPADM

## 2019-01-04 RX ORDER — LORAZEPAM 1 MG/1
1 TABLET ORAL
Status: DISCONTINUED | OUTPATIENT
Start: 2019-01-04 | End: 2019-01-05 | Stop reason: HOSPADM

## 2019-01-04 RX ORDER — POTASSIUM CHLORIDE 20MEQ/15ML
40 LIQUID (ML) ORAL PRN
Status: DISCONTINUED | OUTPATIENT
Start: 2019-01-04 | End: 2019-01-05 | Stop reason: HOSPADM

## 2019-01-04 RX ORDER — CARVEDILOL 3.12 MG/1
3.12 TABLET ORAL 2 TIMES DAILY WITH MEALS
Status: DISCONTINUED | OUTPATIENT
Start: 2019-01-04 | End: 2019-01-05

## 2019-01-04 RX ORDER — LORAZEPAM 2 MG/1
2 TABLET ORAL
Status: DISCONTINUED | OUTPATIENT
Start: 2019-01-04 | End: 2019-01-05 | Stop reason: HOSPADM

## 2019-01-04 RX ORDER — SODIUM CHLORIDE 0.9 % (FLUSH) 0.9 %
10 SYRINGE (ML) INJECTION PRN
Status: DISCONTINUED | OUTPATIENT
Start: 2019-01-04 | End: 2019-01-05 | Stop reason: HOSPADM

## 2019-01-04 RX ORDER — LORAZEPAM 2 MG/ML
3 INJECTION INTRAMUSCULAR
Status: DISCONTINUED | OUTPATIENT
Start: 2019-01-04 | End: 2019-01-05 | Stop reason: HOSPADM

## 2019-01-04 RX ORDER — LORAZEPAM 2 MG/ML
4 INJECTION INTRAMUSCULAR
Status: DISCONTINUED | OUTPATIENT
Start: 2019-01-04 | End: 2019-01-05 | Stop reason: HOSPADM

## 2019-01-04 RX ORDER — POTASSIUM CHLORIDE 20 MEQ/1
40 TABLET, EXTENDED RELEASE ORAL PRN
Status: DISCONTINUED | OUTPATIENT
Start: 2019-01-04 | End: 2019-01-05 | Stop reason: HOSPADM

## 2019-01-04 RX ORDER — LISINOPRIL 5 MG/1
5 TABLET ORAL DAILY
Status: DISCONTINUED | OUTPATIENT
Start: 2019-01-04 | End: 2019-01-05

## 2019-01-04 RX ORDER — MULTIVITAMIN WITH FOLIC ACID 400 MCG
1 TABLET ORAL DAILY
Status: DISCONTINUED | OUTPATIENT
Start: 2019-01-05 | End: 2019-01-05 | Stop reason: HOSPADM

## 2019-01-04 RX ORDER — SODIUM CHLORIDE 0.9 % (FLUSH) 0.9 %
10 SYRINGE (ML) INJECTION EVERY 12 HOURS SCHEDULED
Status: DISCONTINUED | OUTPATIENT
Start: 2019-01-04 | End: 2019-01-05 | Stop reason: HOSPADM

## 2019-01-04 RX ORDER — THIAMINE MONONITRATE (VIT B1) 100 MG
100 TABLET ORAL DAILY
Status: DISCONTINUED | OUTPATIENT
Start: 2019-01-05 | End: 2019-01-05 | Stop reason: HOSPADM

## 2019-01-04 RX ORDER — LORAZEPAM 2 MG/ML
2 INJECTION INTRAMUSCULAR
Status: DISCONTINUED | OUTPATIENT
Start: 2019-01-04 | End: 2019-01-05 | Stop reason: HOSPADM

## 2019-01-04 RX ORDER — POTASSIUM CHLORIDE 7.45 MG/ML
10 INJECTION INTRAVENOUS PRN
Status: DISCONTINUED | OUTPATIENT
Start: 2019-01-04 | End: 2019-01-05 | Stop reason: HOSPADM

## 2019-01-04 RX ORDER — FOLIC ACID 1 MG/1
1 TABLET ORAL DAILY
Status: DISCONTINUED | OUTPATIENT
Start: 2019-01-04 | End: 2019-01-05 | Stop reason: HOSPADM

## 2019-01-04 RX ADMIN — LISINOPRIL 5 MG: 5 TABLET ORAL at 20:15

## 2019-01-04 RX ADMIN — Medication 10 ML: at 20:18

## 2019-01-04 RX ADMIN — ASPIRIN 325 MG: 325 TABLET, DELAYED RELEASE ORAL at 20:15

## 2019-01-04 RX ADMIN — FOLIC ACID: 5 INJECTION, SOLUTION INTRAMUSCULAR; INTRAVENOUS; SUBCUTANEOUS at 20:15

## 2019-01-04 RX ADMIN — DIGOXIN 125 MCG: 125 TABLET ORAL at 20:15

## 2019-01-04 RX ADMIN — RIVAROXABAN 20 MG: 20 TABLET, FILM COATED ORAL at 20:15

## 2019-01-04 RX ADMIN — CARVEDILOL 3.12 MG: 3.12 TABLET, FILM COATED ORAL at 20:15

## 2019-01-04 RX ADMIN — IOPAMIDOL 80 ML: 755 INJECTION, SOLUTION INTRAVENOUS at 13:31

## 2019-01-04 RX ADMIN — FOLIC ACID 1 MG: 1 TABLET ORAL at 20:15

## 2019-01-04 ASSESSMENT — LIFESTYLE VARIABLES: HISTORY_ALCOHOL_USE: YES

## 2019-01-05 VITALS
TEMPERATURE: 97.2 F | HEIGHT: 64 IN | HEART RATE: 70 BPM | WEIGHT: 154 LBS | SYSTOLIC BLOOD PRESSURE: 148 MMHG | RESPIRATION RATE: 16 BRPM | OXYGEN SATURATION: 93 % | DIASTOLIC BLOOD PRESSURE: 69 MMHG | BODY MASS INDEX: 26.29 KG/M2

## 2019-01-05 PROBLEM — F10.20 ALCOHOLISM (HCC): Status: ACTIVE | Noted: 2019-01-05

## 2019-01-05 LAB
ALBUMIN SERPL-MCNC: 3.2 G/DL (ref 3.5–5.1)
ALP BLD-CCNC: 52 U/L (ref 38–126)
ALT SERPL-CCNC: 19 U/L (ref 11–66)
ANION GAP SERPL CALCULATED.3IONS-SCNC: 14 MEQ/L (ref 8–16)
AST SERPL-CCNC: 34 U/L (ref 5–40)
BILIRUB SERPL-MCNC: 0.7 MG/DL (ref 0.3–1.2)
BILIRUBIN DIRECT: < 0.2 MG/DL (ref 0–0.3)
BUN BLDV-MCNC: 14 MG/DL (ref 7–22)
CALCIUM SERPL-MCNC: 8.9 MG/DL (ref 8.5–10.5)
CHLORIDE BLD-SCNC: 99 MEQ/L (ref 98–111)
CO2: 23 MEQ/L (ref 23–33)
CREAT SERPL-MCNC: 0.7 MG/DL (ref 0.4–1.2)
ERYTHROCYTE [DISTWIDTH] IN BLOOD BY AUTOMATED COUNT: 13.2 % (ref 11.5–14.5)
ERYTHROCYTE [DISTWIDTH] IN BLOOD BY AUTOMATED COUNT: 45.1 FL (ref 35–45)
GFR SERPL CREATININE-BSD FRML MDRD: > 90 ML/MIN/1.73M2
GLUCOSE BLD-MCNC: 98 MG/DL (ref 70–108)
HCT VFR BLD CALC: 41.4 % (ref 42–52)
HEMOGLOBIN: 14.3 GM/DL (ref 14–18)
MAGNESIUM: 1.9 MG/DL (ref 1.6–2.4)
MCH RBC QN AUTO: 32.1 PG (ref 26–33)
MCHC RBC AUTO-ENTMCNC: 34.5 GM/DL (ref 32.2–35.5)
MCV RBC AUTO: 92.8 FL (ref 80–94)
PLATELET # BLD: 212 THOU/MM3 (ref 130–400)
PMV BLD AUTO: 10.1 FL (ref 9.4–12.4)
POTASSIUM REFLEX MAGNESIUM: 4.1 MEQ/L (ref 3.5–5.2)
RBC # BLD: 4.46 MILL/MM3 (ref 4.7–6.1)
SODIUM BLD-SCNC: 136 MEQ/L (ref 135–145)
TOTAL PROTEIN: 6 G/DL (ref 6.1–8)
VITAMIN D 25-HYDROXY: 18 NG/ML (ref 30–100)
WBC # BLD: 5.5 THOU/MM3 (ref 4.8–10.8)

## 2019-01-05 PROCEDURE — 6370000000 HC RX 637 (ALT 250 FOR IP): Performed by: INTERNAL MEDICINE

## 2019-01-05 PROCEDURE — 82306 VITAMIN D 25 HYDROXY: CPT

## 2019-01-05 PROCEDURE — 6370000000 HC RX 637 (ALT 250 FOR IP): Performed by: HOSPITALIST

## 2019-01-05 PROCEDURE — G0378 HOSPITAL OBSERVATION PER HR: HCPCS

## 2019-01-05 PROCEDURE — 2500000003 HC RX 250 WO HCPCS: Performed by: HOSPITALIST

## 2019-01-05 PROCEDURE — 2709999900 HC NON-CHARGEABLE SUPPLY

## 2019-01-05 PROCEDURE — 36415 COLL VENOUS BLD VENIPUNCTURE: CPT

## 2019-01-05 PROCEDURE — 2580000003 HC RX 258: Performed by: HOSPITALIST

## 2019-01-05 PROCEDURE — 99243 OFF/OP CNSLTJ NEW/EST LOW 30: CPT | Performed by: PSYCHIATRY & NEUROLOGY

## 2019-01-05 PROCEDURE — 6360000002 HC RX W HCPCS: Performed by: HOSPITALIST

## 2019-01-05 PROCEDURE — 83735 ASSAY OF MAGNESIUM: CPT

## 2019-01-05 PROCEDURE — 85027 COMPLETE CBC AUTOMATED: CPT

## 2019-01-05 PROCEDURE — 80048 BASIC METABOLIC PNL TOTAL CA: CPT

## 2019-01-05 PROCEDURE — 80076 HEPATIC FUNCTION PANEL: CPT

## 2019-01-05 PROCEDURE — 99225 PR SBSQ OBSERVATION CARE/DAY 25 MINUTES: CPT | Performed by: INTERNAL MEDICINE

## 2019-01-05 RX ORDER — NICOTINE 21 MG/24HR
1 PATCH, TRANSDERMAL 24 HOURS TRANSDERMAL DAILY
Qty: 30 PATCH | Refills: 3 | Status: ON HOLD | COMMUNITY
Start: 2019-01-06 | End: 2019-05-23

## 2019-01-05 RX ORDER — CARVEDILOL 6.25 MG/1
6.25 TABLET ORAL 2 TIMES DAILY WITH MEALS
Status: DISCONTINUED | OUTPATIENT
Start: 2019-01-05 | End: 2019-01-05 | Stop reason: HOSPADM

## 2019-01-05 RX ORDER — NICOTINE 21 MG/24HR
1 PATCH, TRANSDERMAL 24 HOURS TRANSDERMAL DAILY
Status: DISCONTINUED | OUTPATIENT
Start: 2019-01-05 | End: 2019-01-05 | Stop reason: HOSPADM

## 2019-01-05 RX ORDER — LISINOPRIL 10 MG/1
10 TABLET ORAL DAILY
Status: DISCONTINUED | OUTPATIENT
Start: 2019-01-05 | End: 2019-01-05 | Stop reason: HOSPADM

## 2019-01-05 RX ORDER — ASPIRIN 81 MG/1
81 TABLET ORAL DAILY
Status: DISCONTINUED | OUTPATIENT
Start: 2019-01-05 | End: 2019-01-05

## 2019-01-05 RX ADMIN — Medication 100 MG: at 08:33

## 2019-01-05 RX ADMIN — FOLIC ACID: 5 INJECTION, SOLUTION INTRAMUSCULAR; INTRAVENOUS; SUBCUTANEOUS at 11:02

## 2019-01-05 RX ADMIN — FOLIC ACID 1 MG: 1 TABLET ORAL at 08:33

## 2019-01-05 RX ADMIN — CARVEDILOL 6.25 MG: 6.25 TABLET, FILM COATED ORAL at 08:32

## 2019-01-05 RX ADMIN — THERA TABS 1 TABLET: TAB at 08:33

## 2019-01-05 RX ADMIN — CARVEDILOL 6.25 MG: 6.25 TABLET, FILM COATED ORAL at 16:31

## 2019-01-05 RX ADMIN — DIGOXIN 125 MCG: 125 TABLET ORAL at 10:59

## 2019-01-05 RX ADMIN — RIVAROXABAN 20 MG: 20 TABLET, FILM COATED ORAL at 16:31

## 2019-01-05 RX ADMIN — LISINOPRIL 10 MG: 10 TABLET ORAL at 10:59

## 2019-01-05 RX ADMIN — LORAZEPAM 1 MG: 1 TABLET ORAL at 02:27

## 2019-01-05 ASSESSMENT — ENCOUNTER SYMPTOMS
WHEEZING: 0
ABDOMINAL DISTENTION: 0
CHEST TIGHTNESS: 0
VOMITING: 0
DIARRHEA: 0
PHOTOPHOBIA: 0
RHINORRHEA: 0
BACK PAIN: 0
NAUSEA: 0
SHORTNESS OF BREATH: 0
SORE THROAT: 0
STRIDOR: 0
ABDOMINAL PAIN: 0

## 2019-01-10 LAB
BLOOD CULTURE, ROUTINE: NORMAL
BLOOD CULTURE, ROUTINE: NORMAL

## 2019-05-23 ENCOUNTER — APPOINTMENT (OUTPATIENT)
Dept: CT IMAGING | Age: 64
DRG: 140 | End: 2019-05-23
Payer: MEDICAID

## 2019-05-23 ENCOUNTER — HOSPITAL ENCOUNTER (INPATIENT)
Age: 64
LOS: 1 days | Discharge: HOME | DRG: 140 | End: 2019-05-24
Attending: EMERGENCY MEDICINE | Admitting: INTERNAL MEDICINE
Payer: MEDICAID

## 2019-05-23 ENCOUNTER — APPOINTMENT (OUTPATIENT)
Dept: GENERAL RADIOLOGY | Age: 64
DRG: 140 | End: 2019-05-23
Payer: MEDICAID

## 2019-05-23 DIAGNOSIS — J44.1 COPD EXACERBATION (HCC): Primary | ICD-10-CM

## 2019-05-23 PROBLEM — E43 SEVERE MALNUTRITION (HCC): Chronic | Status: ACTIVE | Noted: 2019-05-23

## 2019-05-23 LAB
ALBUMIN SERPL-MCNC: 4 G/DL (ref 3.5–5.1)
ALP BLD-CCNC: 72 U/L (ref 38–126)
ALT SERPL-CCNC: 46 U/L (ref 11–66)
ANION GAP SERPL CALCULATED.3IONS-SCNC: 14 MEQ/L (ref 8–16)
AST SERPL-CCNC: 70 U/L (ref 5–40)
BASOPHILS # BLD: 1.8 %
BASOPHILS ABSOLUTE: 0.1 THOU/MM3 (ref 0–0.1)
BILIRUB SERPL-MCNC: 0.5 MG/DL (ref 0.3–1.2)
BILIRUBIN DIRECT: < 0.2 MG/DL (ref 0–0.3)
BUN BLDV-MCNC: 12 MG/DL (ref 7–22)
CALCIUM SERPL-MCNC: 8.8 MG/DL (ref 8.5–10.5)
CHLORIDE BLD-SCNC: 101 MEQ/L (ref 98–111)
CO2: 25 MEQ/L (ref 23–33)
CREAT SERPL-MCNC: 0.7 MG/DL (ref 0.4–1.2)
DIGOXIN LEVEL: < 0.3 NG/ML (ref 0.5–2)
EKG ATRIAL RATE: 78 BPM
EKG P AXIS: 67 DEGREES
EKG P-R INTERVAL: 160 MS
EKG Q-T INTERVAL: 416 MS
EKG QRS DURATION: 102 MS
EKG QTC CALCULATION (BAZETT): 474 MS
EKG R AXIS: 9 DEGREES
EKG T AXIS: 163 DEGREES
EKG VENTRICULAR RATE: 78 BPM
EOSINOPHIL # BLD: 1.6 %
EOSINOPHILS ABSOLUTE: 0.1 THOU/MM3 (ref 0–0.4)
ERYTHROCYTE [DISTWIDTH] IN BLOOD BY AUTOMATED COUNT: 13.5 % (ref 11.5–14.5)
ERYTHROCYTE [DISTWIDTH] IN BLOOD BY AUTOMATED COUNT: 45.1 FL (ref 35–45)
GFR SERPL CREATININE-BSD FRML MDRD: > 90 ML/MIN/1.73M2
GLUCOSE BLD-MCNC: 79 MG/DL (ref 70–108)
HCT VFR BLD CALC: 42.5 % (ref 42–52)
HEMOGLOBIN: 14.9 GM/DL (ref 14–18)
IMMATURE GRANS (ABS): 0.02 THOU/MM3 (ref 0–0.07)
IMMATURE GRANULOCYTES: 0.3 %
LYMPHOCYTES # BLD: 34.9 %
LYMPHOCYTES ABSOLUTE: 2.2 THOU/MM3 (ref 1–4.8)
MCH RBC QN AUTO: 32 PG (ref 26–33)
MCHC RBC AUTO-ENTMCNC: 35.1 GM/DL (ref 32.2–35.5)
MCV RBC AUTO: 91.4 FL (ref 80–94)
MONOCYTES # BLD: 9.7 %
MONOCYTES ABSOLUTE: 0.6 THOU/MM3 (ref 0.4–1.3)
NUCLEATED RED BLOOD CELLS: 0 /100 WBC
OSMOLALITY CALCULATION: 278.1 MOSMOL/KG (ref 275–300)
PLATELET # BLD: 229 THOU/MM3 (ref 130–400)
PMV BLD AUTO: 9.3 FL (ref 9.4–12.4)
POTASSIUM SERPL-SCNC: 4.4 MEQ/L (ref 3.5–5.2)
RBC # BLD: 4.65 MILL/MM3 (ref 4.7–6.1)
SEG NEUTROPHILS: 51.7 %
SEGMENTED NEUTROPHILS ABSOLUTE COUNT: 3.3 THOU/MM3 (ref 1.8–7.7)
SODIUM BLD-SCNC: 140 MEQ/L (ref 135–145)
TOTAL PROTEIN: 6.9 G/DL (ref 6.1–8)
TROPONIN T: < 0.01 NG/ML
WBC # BLD: 6.3 THOU/MM3 (ref 4.8–10.8)

## 2019-05-23 PROCEDURE — 6370000000 HC RX 637 (ALT 250 FOR IP): Performed by: EMERGENCY MEDICINE

## 2019-05-23 PROCEDURE — 93005 ELECTROCARDIOGRAM TRACING: CPT | Performed by: EMERGENCY MEDICINE

## 2019-05-23 PROCEDURE — 96365 THER/PROPH/DIAG IV INF INIT: CPT

## 2019-05-23 PROCEDURE — 84484 ASSAY OF TROPONIN QUANT: CPT

## 2019-05-23 PROCEDURE — 71275 CT ANGIOGRAPHY CHEST: CPT

## 2019-05-23 PROCEDURE — 6370000000 HC RX 637 (ALT 250 FOR IP): Performed by: INTERNAL MEDICINE

## 2019-05-23 PROCEDURE — 6360000002 HC RX W HCPCS: Performed by: EMERGENCY MEDICINE

## 2019-05-23 PROCEDURE — 2700000000 HC OXYGEN THERAPY PER DAY

## 2019-05-23 PROCEDURE — 93010 ELECTROCARDIOGRAM REPORT: CPT | Performed by: INTERNAL MEDICINE

## 2019-05-23 PROCEDURE — 36415 COLL VENOUS BLD VENIPUNCTURE: CPT

## 2019-05-23 PROCEDURE — 99285 EMERGENCY DEPT VISIT HI MDM: CPT

## 2019-05-23 PROCEDURE — 80076 HEPATIC FUNCTION PANEL: CPT

## 2019-05-23 PROCEDURE — 94761 N-INVAS EAR/PLS OXIMETRY MLT: CPT

## 2019-05-23 PROCEDURE — 2500000003 HC RX 250 WO HCPCS: Performed by: INTERNAL MEDICINE

## 2019-05-23 PROCEDURE — 2580000003 HC RX 258: Performed by: INTERNAL MEDICINE

## 2019-05-23 PROCEDURE — 85025 COMPLETE CBC W/AUTO DIFF WBC: CPT

## 2019-05-23 PROCEDURE — 80162 ASSAY OF DIGOXIN TOTAL: CPT

## 2019-05-23 PROCEDURE — 99223 1ST HOSP IP/OBS HIGH 75: CPT | Performed by: INTERNAL MEDICINE

## 2019-05-23 PROCEDURE — 80048 BASIC METABOLIC PNL TOTAL CA: CPT

## 2019-05-23 PROCEDURE — 6360000002 HC RX W HCPCS: Performed by: INTERNAL MEDICINE

## 2019-05-23 PROCEDURE — 6360000004 HC RX CONTRAST MEDICATION: Performed by: EMERGENCY MEDICINE

## 2019-05-23 PROCEDURE — 94640 AIRWAY INHALATION TREATMENT: CPT

## 2019-05-23 PROCEDURE — 96375 TX/PRO/DX INJ NEW DRUG ADDON: CPT

## 2019-05-23 PROCEDURE — 2580000003 HC RX 258: Performed by: EMERGENCY MEDICINE

## 2019-05-23 PROCEDURE — 71046 X-RAY EXAM CHEST 2 VIEWS: CPT

## 2019-05-23 PROCEDURE — 1200000003 HC TELEMETRY R&B

## 2019-05-23 RX ORDER — CARVEDILOL 3.12 MG/1
3.12 TABLET ORAL 2 TIMES DAILY WITH MEALS
Status: DISCONTINUED | OUTPATIENT
Start: 2019-05-23 | End: 2019-05-23

## 2019-05-23 RX ORDER — ONDANSETRON 2 MG/ML
4 INJECTION INTRAMUSCULAR; INTRAVENOUS EVERY 6 HOURS PRN
Status: DISCONTINUED | OUTPATIENT
Start: 2019-05-23 | End: 2019-05-24 | Stop reason: HOSPADM

## 2019-05-23 RX ORDER — IPRATROPIUM BROMIDE AND ALBUTEROL SULFATE 2.5; .5 MG/3ML; MG/3ML
1 SOLUTION RESPIRATORY (INHALATION) ONCE
Status: COMPLETED | OUTPATIENT
Start: 2019-05-23 | End: 2019-05-23

## 2019-05-23 RX ORDER — DIGOXIN 125 MCG
125 TABLET ORAL DAILY
Status: DISCONTINUED | OUTPATIENT
Start: 2019-05-23 | End: 2019-05-23

## 2019-05-23 RX ORDER — MULTIVITAMIN WITH FOLIC ACID 400 MCG
1 TABLET ORAL DAILY
Status: DISCONTINUED | OUTPATIENT
Start: 2019-05-23 | End: 2019-05-23

## 2019-05-23 RX ORDER — METHYLPREDNISOLONE SODIUM SUCCINATE 125 MG/2ML
125 INJECTION, POWDER, LYOPHILIZED, FOR SOLUTION INTRAMUSCULAR; INTRAVENOUS ONCE
Status: COMPLETED | OUTPATIENT
Start: 2019-05-23 | End: 2019-05-23

## 2019-05-23 RX ORDER — FOLIC ACID 1 MG/1
1 TABLET ORAL DAILY
Status: DISCONTINUED | OUTPATIENT
Start: 2019-05-23 | End: 2019-05-23

## 2019-05-23 RX ORDER — IPRATROPIUM BROMIDE AND ALBUTEROL SULFATE 2.5; .5 MG/3ML; MG/3ML
1 SOLUTION RESPIRATORY (INHALATION)
Status: DISCONTINUED | OUTPATIENT
Start: 2019-05-23 | End: 2019-05-24 | Stop reason: HOSPADM

## 2019-05-23 RX ORDER — METHYLPREDNISOLONE SODIUM SUCCINATE 40 MG/ML
40 INJECTION, POWDER, LYOPHILIZED, FOR SOLUTION INTRAMUSCULAR; INTRAVENOUS DAILY
Status: DISCONTINUED | OUTPATIENT
Start: 2019-05-23 | End: 2019-05-24 | Stop reason: HOSPADM

## 2019-05-23 RX ORDER — THIAMINE MONONITRATE (VIT B1) 100 MG
100 TABLET ORAL DAILY
Status: DISCONTINUED | OUTPATIENT
Start: 2019-05-23 | End: 2019-05-23

## 2019-05-23 RX ORDER — HYDRALAZINE HYDROCHLORIDE 25 MG/1
25 TABLET, FILM COATED ORAL EVERY 8 HOURS SCHEDULED
Status: DISCONTINUED | OUTPATIENT
Start: 2019-05-23 | End: 2019-05-24

## 2019-05-23 RX ORDER — LISINOPRIL 5 MG/1
5 TABLET ORAL DAILY
Status: DISCONTINUED | OUTPATIENT
Start: 2019-05-23 | End: 2019-05-23

## 2019-05-23 RX ORDER — AMLODIPINE BESYLATE 5 MG/1
5 TABLET ORAL DAILY PRN
Status: DISCONTINUED | OUTPATIENT
Start: 2019-05-23 | End: 2019-05-24 | Stop reason: HOSPADM

## 2019-05-23 RX ORDER — NICOTINE 21 MG/24HR
1 PATCH, TRANSDERMAL 24 HOURS TRANSDERMAL DAILY
Status: DISCONTINUED | OUTPATIENT
Start: 2019-05-23 | End: 2019-05-23

## 2019-05-23 RX ORDER — ACETAMINOPHEN 325 MG/1
650 TABLET ORAL EVERY 4 HOURS PRN
Status: DISCONTINUED | OUTPATIENT
Start: 2019-05-23 | End: 2019-05-24 | Stop reason: HOSPADM

## 2019-05-23 RX ADMIN — IOPAMIDOL 80 ML: 755 INJECTION, SOLUTION INTRAVENOUS at 11:22

## 2019-05-23 RX ADMIN — IPRATROPIUM BROMIDE AND ALBUTEROL SULFATE 1 AMPULE: .5; 3 SOLUTION RESPIRATORY (INHALATION) at 16:30

## 2019-05-23 RX ADMIN — METHYLPREDNISOLONE SODIUM SUCCINATE 40 MG: 40 INJECTION, POWDER, FOR SOLUTION INTRAMUSCULAR; INTRAVENOUS at 15:34

## 2019-05-23 RX ADMIN — IPRATROPIUM BROMIDE AND ALBUTEROL SULFATE 1 AMPULE: .5; 3 SOLUTION RESPIRATORY (INHALATION) at 08:44

## 2019-05-23 RX ADMIN — IPRATROPIUM BROMIDE AND ALBUTEROL SULFATE 1 AMPULE: .5; 3 SOLUTION RESPIRATORY (INHALATION) at 12:29

## 2019-05-23 RX ADMIN — DOXYCYCLINE 100 MG: 100 INJECTION, POWDER, LYOPHILIZED, FOR SOLUTION INTRAVENOUS at 15:42

## 2019-05-23 RX ADMIN — IPRATROPIUM BROMIDE AND ALBUTEROL SULFATE 1 AMPULE: .5; 3 SOLUTION RESPIRATORY (INHALATION) at 20:41

## 2019-05-23 RX ADMIN — HYDRALAZINE HYDROCHLORIDE 25 MG: 25 TABLET, FILM COATED ORAL at 17:27

## 2019-05-23 RX ADMIN — AZITHROMYCIN MONOHYDRATE 500 MG: 500 INJECTION, POWDER, LYOPHILIZED, FOR SOLUTION INTRAVENOUS at 12:09

## 2019-05-23 RX ADMIN — HYDRALAZINE HYDROCHLORIDE 25 MG: 25 TABLET, FILM COATED ORAL at 20:18

## 2019-05-23 RX ADMIN — METHYLPREDNISOLONE SODIUM SUCCINATE 125 MG: 125 INJECTION, POWDER, FOR SOLUTION INTRAMUSCULAR; INTRAVENOUS at 09:49

## 2019-05-23 ASSESSMENT — ENCOUNTER SYMPTOMS
WHEEZING: 0
VOICE CHANGE: 0
NAUSEA: 0
CHEST TIGHTNESS: 0
PHOTOPHOBIA: 0
BACK PAIN: 1
FACIAL SWELLING: 0
TROUBLE SWALLOWING: 0
BLOOD IN STOOL: 0
DIARRHEA: 0
VOMITING: 0
SHORTNESS OF BREATH: 1
COUGH: 1
ABDOMINAL PAIN: 0
SORE THROAT: 0

## 2019-05-23 ASSESSMENT — PAIN DESCRIPTION - LOCATION: LOCATION: BACK

## 2019-05-23 ASSESSMENT — PAIN DESCRIPTION - FREQUENCY: FREQUENCY: INTERMITTENT

## 2019-05-23 ASSESSMENT — PAIN DESCRIPTION - ORIENTATION: ORIENTATION: MID

## 2019-05-23 ASSESSMENT — PAIN DESCRIPTION - PAIN TYPE
TYPE: OTHER (COMMENT)
TYPE: ACUTE PAIN

## 2019-05-23 ASSESSMENT — PAIN SCALES - GENERAL
PAINLEVEL_OUTOF10: 0
PAINLEVEL_OUTOF10: 0
PAINLEVEL_OUTOF10: 7

## 2019-05-23 NOTE — PROGRESS NOTES
Pharmacy Medication History Note      List of current medications patient is taking is complete. Source of information: patient reports he does not take any maintenance medications. He does not recall ever being prescribed blood pressure medication or blood thinners (2017), therefore cannot report when he stopped taking these medications. Changes made to medication list:  Medications removed (include reason, ex.  therapy complete or physician discontinued):  Coreg 3.125 mg BID - pt denies, cannot recall when stopped   Digoxin 0.125 mg daily- pt denies  Folic acid- pt denies  Lisinopril 5 mg daily- pt denies   MV- pt denies  Nicotine patch- pt denies   Xarelto- pt denies  Thiamine- pt denies    Medications added/doses adjusted:  none    Electronically signed by Graciella Kanner, [facility] on 5/23/2019 at 1:37 PM

## 2019-05-23 NOTE — H&P
History & Physical        Patient:  Abdon Hilliard  YOB: 1955    MRN: [id number]     Acct: [de-identified]    PCP: JESSICA Maldonado CNP    Date of Admission: 5/23/2019    Date of Service: Pt seen/examined on 05/23/19  and Admitted to Inpatient with expected LOS greater than two midnights due to medical therapy. Chief Complaint:  Dyspnea    History Of Present Illness:   58 y.o. male who presented to [facility] with dyspnea \"c/o asthma attack at work. \" consisting with cough and productive sputum not bloody, no fever with chest tightness. Patient did not respond to ER treatment per ED MD. Will accept patient due to failure to improve therapy in ED. Patient had CTA showing old PNA, COPD, Pulm HTN and cardiomegaly. Denies wearing o2 at home. Past Medical History:          Diagnosis Date    Brain tumor ([facility] [address]     COPD (chronic obstructive pulmonary disease) ([facility] [address]     Coronary artery disease involving native coronary artery of native heart without angina pectoris     Essential hypertension     S/P cardiac catheterization: 3/27/2017: 40-50% mid-LAD, 60-70% mid-Ramus with FFR of 0.9, and 50% mid-RCA. LVEF 25-30%. Severe AI 3+.  3/27/2017    3/27/2017: 40-50% mid-LAD, 60-70% mid-Ramus with FFR of 0.9, and 50% mid-RCA. LVEF 25-30%. Severe AI 3+. Radial approach. 100 cc contrast. Dr. Luis Cary       Past Surgical History:          Procedure Laterality Date    BRAIN SURGERY      removal of brain tumor, age 1    COLONOSCOPY         Medications Prior to Admission:      Prior to Admission medications    Medication Sig Start Date End Date Taking?  Authorizing Provider   nicotine (NICODERM CQ) 21 MG/24HR Place 1 patch onto the skin daily 1/6/19   Chyna Mullins MD   rivaroxaban Pheobe Sims) 20 MG TABS tablet Take 1 tablet by mouth daily 11/18/17 1/27/18  JESSICA Coronel CNP   carvedilol (COREG) 3.125 MG tablet Take 1 tablet by mouth 2 times daily (with meals) 10/30/17   Micki Renae,  digoxin (LANOXIN) 125 MCG tablet Take 1 tablet by mouth daily 10/31/17   Brando Brass, DO   folic acid (FOLVITE) 1 MG tablet Take 1 tablet by mouth daily 10/31/17   Brando Brass, DO   lisinopril (PRINIVIL;ZESTRIL) 5 MG tablet Take 1 tablet by mouth daily 10/31/17   Brando Brass, DO   Multiple Vitamin (MULTIVITAMIN) tablet Take 1 tablet by mouth daily 10/31/17   Brando Brass, DO   vitamin B-1 100 MG tablet Take 1 tablet by mouth daily 10/31/17   Brando Brass, DO       Allergies:  Patient has no known allergies. Social History:      TOBACCO:   reports that he has been smoking cigarettes. He has a 75.00 pack-year smoking history. He has never used smokeless tobacco.  ETOH:   reports that he drinks about 16.8 oz of alcohol per week. Family History:     Reviewed in detail and negative for DM, CAD, Cancer, CVA. Positive as follows:        Problem Relation Age of Onset    Cancer Mother     Cancer Father     Diabetes Brother        Diet:  No diet orders on file    REVIEW OF SYSTEMS:   Pertinent positives as noted in the HPI. All other systems reviewed and negative. PHYSICAL EXAM:    BP (!) 177/59   Pulse 81   Temp 98.1 °F (36.7 °C) (Oral)   Resp 20   Ht 5' 4\" (1.626 m)   Wt 165 lb (74.8 kg)   SpO2 96%   BMI 28.32 kg/m²     General appearance: Sick looking male in need for o2 2L  HEENT:  Normal cephalic, atraumatic without obvious deformity. Pupils equal, round, and reactive to light. Extra ocular muscles intact. Conjunctivae/corneas clear. Neck: Supple, with full range of motion. No jugular venous distention. Trachea midline. Respiratory:  Diffuse expiratory wheezing. Cardiovascular:  Regular rate and rhythm with normal S1/S2 without murmurs, rubs or gallops. Abdomen: Soft, non-tender, non-distended with normal bowel sounds. Musculoskeletal:  No clubbing, cyanosis or edema bilaterally. Full range of motion without deformity.   Skin: Skin color, texture, turgor normal.  No rashes or lesions. Neurologic:  Neurovascularly intact without any focal sensory/motor deficits. Cranial nerves: II-XII intact, grossly non-focal.  Psychiatric:  Alert and oriented, thought content appropriate, normal insight  Capillary Refill: Brisk,< 3 seconds   Peripheral Pulses: +2 palpable, equal bilaterally       Labs:     Recent Labs     05/23/19  0811   WBC 6.3   HGB 14.9   HCT 42.5        Recent Labs     05/23/19  0811      K 4.4      CO2 25   BUN 12   CREATININE 0.7   CALCIUM 8.8     Recent Labs     05/23/19  0811   AST 70*   ALT 46   BILIDIR <0.2   BILITOT 0.5   ALKPHOS 72     No results for input(s): INR in the last 72 hours. No results for input(s): Az Aver in the last 72 hours. Urinalysis:      Lab Results   Component Value Date    NITRU NEGATIVE 01/04/2019    WBCUA 0-2 01/04/2019    BACTERIA NONE 01/04/2019    RBCUA 3-5 01/04/2019    BLOODU SMALL 01/04/2019    GLUCOSEU NEGATIVE 01/04/2019       Intake & Output:  No intake/output data recorded. No intake/output data recorded. Radiology:     EKG:  I have reviewed the EKG with the following interpretation: NSR without ST changes , or twi      CTA CHEST W WO CONTRAST   Final Result   1. No pulmonary emboli are seen. 2. Moderate left ventriculomegaly. Findings of pulmonary arterial hypertension. Fibroemphysematous lungs. 3. Chronic atelectatic/fibrotic stranding superselective left lower lobe. Mild fibrotic scarring left lung base. 4. Mild groundglass appearance right posterior costophrenic sulcus, more prominent than on prior study, consistent with atelectasis/fibrosis/pneumonitis. **This report has been created using voice recognition software. It may contain minor errors which are inherent in voice recognition technology. **          Final report electronically signed by Dr. Lamar Khan on 5/23/2019 11:40 AM      XR CHEST STANDARD (2 VW)   Final Result   1. Moderate cardiomegaly.  Cardiac silhouette

## 2019-05-23 NOTE — ED NOTES
Patient resting quietly in cot showing no signs of distress at this time. Denies any pain. Updated on POC. Will continue to monitor.       Tamia Rodrigues RN  05/23/19 8156

## 2019-05-23 NOTE — ED NOTES
Patient resting quietly in cot showing no signs of distress at this time. Denies any pain. Updated on POC. Will continue to monitor.       Rick Son RN  05/23/19 8797

## 2019-05-23 NOTE — ED PROVIDER NOTES
[facility] [address] [address] EMERGENCY DEPT  EMERGENCY DEPARTMENT     Pt Name: Sarah Kaiser  MRN: [id number]  [place] 1955  Date of evaluation: 5/23/2019  Provider: Tricia Maldonado DO, [place] COMPLAINT       Chief Complaint   Patient presents with    Shortness of Breath    Back Pain       HISTORY OF PRESENT ILLNESS    Sarah Kaiser is a 58 y.o. male who presents to the emergency department from home with complaints of shortness of breath, cough productive purulent sputum, as well as midthoracic back pain. Patient states the symptoms started suddenly or course the last 4 days when he moved into his new apartment. He has not had any fever or chills. He has had some tightness in the chest, most of it has been in the back. It is not pleuritic in nature, he denies any lower extremity pain or swelling. He is a one half pack a day smoker, has a diagnosis of COPD. No nausea vomiting, no diaphoresis, shortness of breath is at rest and worse with exertion, no orthopnea. He has a known heart murmur      Triage notes and Nursing notes were reviewed by myself. Any discrepancies are addressed above. PAST MEDICAL HISTORY     Past Medical History:   Diagnosis Date    [facility] tumor ([facility]     COPD (chronic obstructive pulmonary disease) ([facility]     Coronary artery disease involving native coronary artery of native heart without angina pectoris     Essential hypertension     S/P cardiac catheterization: 3/27/2017: 40-50% mid-LAD, 60-70% mid-Ramus with FFR of 0.9, and 50% mid-RCA. LVEF 25-30%. Severe AI 3+.  3/27/2017    3/27/2017: 40-50% mid-LAD, 60-70% mid-Ramus with FFR of 0.9, and 50% mid-RCA. LVEF 25-30%. Severe AI 3+. Radial approach.  100 cc contrast. Dr. Bo Flores       Past Surgical History:   Procedure Laterality Date    BRAIN SURGERY      removal of brain tumor, age 2    COLONOSCOPY         CURRENT MEDICATIONS       Previous Medications    CARVEDILOL (COREG) 3.125 MG TABLET    Take 1 tablet by mouth 2 times daily (with meals)    DIGOXIN (LANOXIN) 125 MCG TABLET    Take 1 tablet by mouth daily    FOLIC ACID (FOLVITE) 1 MG TABLET    Take 1 tablet by mouth daily    LISINOPRIL (PRINIVIL;ZESTRIL) 5 MG TABLET    Take 1 tablet by mouth daily    MULTIPLE VITAMIN (MULTIVITAMIN) TABLET    Take 1 tablet by mouth daily    NICOTINE (NICODERM CQ) 21 MG/24HR    Place 1 patch onto the skin daily    RIVAROXABAN (XARELTO) 20 MG TABS TABLET    Take 1 tablet by mouth daily    VITAMIN B-1 100 MG TABLET    Take 1 tablet by mouth daily       ALLERGIES     Patient has no known allergies. FAMILY HISTORY       Family History   Problem Relation Age of Onset    Cancer Mother     Cancer Father     Diabetes Brother         SOCIAL HISTORY       Social History     Socioeconomic History    Marital status: Single     Spouse name: None    Number of children: 2    Years of education: 15    Highest education level: None   Occupational History    None   Social Needs    Financial resource strain: None    Food insecurity:     Worry: None     Inability: None    Transportation needs:     Medical: None     Non-medical: None   Tobacco Use    Smoking status: Current Every Day Smoker     Packs/day: 1.50     Years: 50.00     Pack years: 75.00     Types: Cigarettes    Smokeless tobacco: Never Used   Substance and Sexual Activity    Alcohol use:  Yes     Alcohol/week: 16.8 oz     Types: 28 Cans of beer per week     Comment: daily drinker    Drug use: No    Sexual activity: Not Currently     Partners: Female   Lifestyle    Physical activity:     Days per week: None     Minutes per session: None    Stress: None   Relationships    Social connections:     Talks on phone: None     Gets together: None     Attends L[employer]-day service: None     Active member of club or organization: None     Attends meetings of clubs or organizations: None     Relationship status: None    Intimate partner violence:     Fear of current or ex injected. No scleral icterus. Neck: Normal range of motion. Neck supple. No tracheal deviation present. No thyromegaly present. Cardiovascular: Normal rate, regular rhythm and intact distal pulses. Exam reveals no gallop and no friction rub. Murmur heard. Holosystolic murmur is present   Pulmonary/Chest: No stridor. No respiratory distress. He has no wheezes. He has no rales. Increased work of breathing, wheezing all lung fields, diminished bilaterally   Abdominal: Soft. Bowel sounds are normal. He exhibits no distension and no mass. There is no tenderness. There is no rebound and no guarding. Negative Kerr's sign  Nontender McBurney's Point  Negative Rovsig's sign  No bruising or echymosis of abdomen   Musculoskeletal: He exhibits no edema or tenderness. Negative Samara's Sign bilaterally   Lymphadenopathy:     He has no cervical adenopathy. Neurological: He is alert and oriented to person, place, and time. No cranial nerve deficit. He exhibits normal muscle tone. Coordination normal.   No nystagmus   Skin: Skin is warm and dry. No rash noted. He is not diaphoretic. No erythema. No pallor. Psychiatric: He has a normal mood and affect. His behavior is normal. Thought content normal.   Nursing note and vitals reviewed. DIAGNOSTIC RESULTS     EKG:(none if blank)  All EKG's are [facility] who either signs or Co-signs this chart in the absence of a cardiologist.    EKG shows a sinus rhythm, normal IA, QRS, and QTc intervals. No ectopy. There is T-wave inversion in lead I and aVL as well as V6.  which is consistent with LVH. This is essentially unchanged from his previous EKG from January 2019    RADIOLOGY: (none if blank)   Interpretation per the Radiologistbelow, if available at the time of this note:    CTA [address]   Final Result   1. No pulmonary emboli are seen. 2. Moderate left ventriculomegaly.  Findings of pulmonary arterial hypertension. Fibroemphysematous lungs. 3. Chronic atelectatic/fibrotic stranding superselective left lower lobe. Mild fibrotic scarring left lung base. 4. Mild groundglass appearance right posterior costophrenic sulcus, more prominent than on prior study, consistent with atelectasis/fibrosis/pneumonitis. **This report has been created using voice recognition software. It may contain minor errors which are inherent in voice recognition technology. **          Final report electronically signed by Dr. Joan Leonardo on 5/23/2019 11:40 AM      XR CHEST STANDARD (2 VW)   Final Result   1. Moderate cardiomegaly. Cardiac silhouette has increased in size since prior study. 2. Lungs hyperinflated and fibroemphysematous in appearance. 3. Increased markings superior segment left lower lobe. Patient had pneumonia at the site of prior study. Current findings could be due to fibrosis versus mild recurrent atelectasis/pneumonia. 4. Slightly increased markings right midlung. Cannot exclude mild interstitial pneumonitis. 5. Minimal atelectatic/fibrotic stranding in the right posterior costophrenic angle            **This report has been created using voice recognition software. It may contain minor errors which are inherent in voice recognition technology. **      Final report electronically signed by Dr. Joan Leonardo on 5/23/2019 8:49 AM          LABS:  Labs Reviewed   CBC WITH AUTO DIFFERENTIAL - Abnormal; Notable for the following components:       Result Value    RBC 4.65 (*)     RDW-SD 45.1 (*)     MPV 9.3 (*)     All other components within normal limits   HEPATIC FUNCTION PANEL - Abnormal; Notable for the following components:    AST 70 (*)     All other components within normal limits   BASIC METABOLIC PANEL   TROPONIN   ANION GAP   OSMOLALITY   GLOMERULAR FILTRATION RATE, ESTIMATED       All other labs were within normal range or not returned as of this dictation.   Please note, any cultures that may have been sent were not resulted at the time of this patient visit. EMERGENCY DEPARTMENT COURSE andMedical Decision Making:     MDM/  ED Course as of May 23 1202   Thu May 23, 2019   1200 Patient improved after initial DuoNeb, however on reassessment, feels very short of breath. With ambulation he does seem to be dyspneic. CT chest negative for clot, however suspect given his purulent sputum he may have infectious etiology. We'll therefore start him on antibiotics    [AB]      ED Course User Index  [AB] Magalene Harada, DO       ED Medications administered this visit:    Medications   ipratropium-albuterol (DUONEB) nebulizer solution 1 ampule (has no administration in time range)   azithromycin (ZITHROMAX) 500 mg in D5W 250ml addavial (has no administration in time range)   ipratropium-albuterol (DUONEB) nebulizer solution 1 ampule (1 ampule Inhalation Given 5/23/19 0831)   methylPREDNISolone sodium (SOLU-MEDROL) injection 125 mg (125 mg Intravenous Given 5/23/19 0949)   iopamidol (ISOVUE-370) 76 % injection 80 mL (80 mLs Intravenous Given 5/23/19 1122)         Procedures: (None if blank)       CLINICAL       1. COPD exacerbation ([facility]          DISPOSITION/PLAN   DISPOSITION Decision To Admit 05/23/2019 11:47:15 AM      PATIENT REFERRED TO:  No follow-up provider specified.     DISCHARGE MEDICATIONS:  New Prescriptions    No medications on file              (Please note that portions of this note were completed with a voice recognition program.  Efforts were made to edit the dictations but occasionallywords are mis-transcribed.)      Jakob Sellers DO,FACETAMMI (electronically signed)  Attending Physician, Emergency [address] [place] [address]   05/23/19 1204

## 2019-05-23 NOTE — ED NOTES
Pt transported to [facility] on cart in stable condition. Floor contacted before transport. Spoke with Jv Flores.      Jayden Fisher  05/23/19 7151

## 2019-05-24 ENCOUNTER — TELEPHONE (OUTPATIENT)
Dept: INTERNAL MEDICINE CLINIC | Age: 64
End: 2019-05-24

## 2019-05-24 VITALS
DIASTOLIC BLOOD PRESSURE: 70 MMHG | WEIGHT: 137.5 LBS | HEIGHT: 64 IN | RESPIRATION RATE: 20 BRPM | BODY MASS INDEX: 23.47 KG/M2 | SYSTOLIC BLOOD PRESSURE: 163 MMHG | HEART RATE: 88 BPM | OXYGEN SATURATION: 98 % | TEMPERATURE: 98.8 F

## 2019-05-24 LAB
ANION GAP SERPL CALCULATED.3IONS-SCNC: 16 MEQ/L (ref 8–16)
BUN BLDV-MCNC: 15 MG/DL (ref 7–22)
CALCIUM SERPL-MCNC: 9.7 MG/DL (ref 8.5–10.5)
CHLORIDE BLD-SCNC: 97 MEQ/L (ref 98–111)
CO2: 26 MEQ/L (ref 23–33)
CREAT SERPL-MCNC: 0.7 MG/DL (ref 0.4–1.2)
ERYTHROCYTE [DISTWIDTH] IN BLOOD BY AUTOMATED COUNT: 13.5 % (ref 11.5–14.5)
ERYTHROCYTE [DISTWIDTH] IN BLOOD BY AUTOMATED COUNT: 44.3 FL (ref 35–45)
GFR SERPL CREATININE-BSD FRML MDRD: > 90 ML/MIN/1.73M2
GLUCOSE BLD-MCNC: 117 MG/DL (ref 70–108)
HCT VFR BLD CALC: 45.3 % (ref 42–52)
HEMOGLOBIN: 15.8 GM/DL (ref 14–18)
MCH RBC QN AUTO: 31.5 PG (ref 26–33)
MCHC RBC AUTO-ENTMCNC: 34.9 GM/DL (ref 32.2–35.5)
MCV RBC AUTO: 90.4 FL (ref 80–94)
PLATELET # BLD: 223 THOU/MM3 (ref 130–400)
PMV BLD AUTO: 9.4 FL (ref 9.4–12.4)
POTASSIUM SERPL-SCNC: 4.1 MEQ/L (ref 3.5–5.2)
RBC # BLD: 5.01 MILL/MM3 (ref 4.7–6.1)
SODIUM BLD-SCNC: 139 MEQ/L (ref 135–145)
WBC # BLD: 13.9 THOU/MM3 (ref 4.8–10.8)

## 2019-05-24 PROCEDURE — 80048 BASIC METABOLIC PNL TOTAL CA: CPT

## 2019-05-24 PROCEDURE — 6370000000 HC RX 637 (ALT 250 FOR IP): Performed by: INTERNAL MEDICINE

## 2019-05-24 PROCEDURE — 36415 COLL VENOUS BLD VENIPUNCTURE: CPT

## 2019-05-24 PROCEDURE — 85027 COMPLETE CBC AUTOMATED: CPT

## 2019-05-24 PROCEDURE — 6360000002 HC RX W HCPCS: Performed by: INTERNAL MEDICINE

## 2019-05-24 PROCEDURE — 2500000003 HC RX 250 WO HCPCS: Performed by: INTERNAL MEDICINE

## 2019-05-24 PROCEDURE — 94669 MECHANICAL CHEST WALL OSCILL: CPT

## 2019-05-24 PROCEDURE — 2580000003 HC RX 258: Performed by: INTERNAL MEDICINE

## 2019-05-24 PROCEDURE — 94640 AIRWAY INHALATION TREATMENT: CPT

## 2019-05-24 PROCEDURE — 99238 HOSP IP/OBS DSCHRG MGMT 30/<: CPT | Performed by: INTERNAL MEDICINE

## 2019-05-24 RX ORDER — ALBUTEROL SULFATE 90 UG/1
2 AEROSOL, METERED RESPIRATORY (INHALATION) 4 TIMES DAILY
Qty: 1 INHALER | Refills: 3 | Status: SHIPPED | OUTPATIENT
Start: 2019-05-24

## 2019-05-24 RX ORDER — ALBUTEROL SULFATE 90 UG/1
2 AEROSOL, METERED RESPIRATORY (INHALATION) 4 TIMES DAILY
Status: DISCONTINUED | OUTPATIENT
Start: 2019-05-24 | End: 2019-05-24 | Stop reason: HOSPADM

## 2019-05-24 RX ORDER — LISINOPRIL 5 MG/1
5 TABLET ORAL DAILY
Qty: 90 TABLET | Refills: 1 | Status: SHIPPED | OUTPATIENT
Start: 2019-05-24 | End: 2019-05-24 | Stop reason: SDUPTHER

## 2019-05-24 RX ORDER — LISINOPRIL 5 MG/1
5 TABLET ORAL DAILY
Status: DISCONTINUED | OUTPATIENT
Start: 2019-05-24 | End: 2019-05-24 | Stop reason: HOSPADM

## 2019-05-24 RX ORDER — LISINOPRIL 5 MG/1
5 TABLET ORAL DAILY
Qty: 90 TABLET | Refills: 1 | Status: SHIPPED | OUTPATIENT
Start: 2019-05-24

## 2019-05-24 RX ORDER — ALBUTEROL SULFATE 90 UG/1
2 AEROSOL, METERED RESPIRATORY (INHALATION) 4 TIMES DAILY
Qty: 1 INHALER | Refills: 3 | Status: SHIPPED | OUTPATIENT
Start: 2019-05-24 | End: 2019-05-24

## 2019-05-24 RX ADMIN — HYDRALAZINE HYDROCHLORIDE 25 MG: 25 TABLET, FILM COATED ORAL at 06:07

## 2019-05-24 RX ADMIN — DOXYCYCLINE 100 MG: 100 INJECTION, POWDER, LYOPHILIZED, FOR SOLUTION INTRAVENOUS at 03:53

## 2019-05-24 RX ADMIN — IPRATROPIUM BROMIDE AND ALBUTEROL SULFATE 1 AMPULE: .5; 3 SOLUTION RESPIRATORY (INHALATION) at 09:14

## 2019-05-24 RX ADMIN — METHYLPREDNISOLONE SODIUM SUCCINATE 40 MG: 40 INJECTION, POWDER, FOR SOLUTION INTRAMUSCULAR; INTRAVENOUS at 09:22

## 2019-05-24 ASSESSMENT — PAIN SCALES - GENERAL: PAINLEVEL_OUTOF10: 0

## 2019-05-24 NOTE — PLAN OF CARE
Problem: Falls - Risk of:  Goal: Will remain free from falls  Description  Will remain free from falls  Outcome: Ongoing  Note:   Pt free from falls this shift. Bed alarm on, 2/4 side rails up, call light in reach, fall band on, nonskid socks on, clear pathway, bed in locked and lowest position. Will continue to monitor. Goal: Absence of physical injury  Description  Absence of physical injury  Outcome: Ongoing  Note:   Pt free from physical injury this shift. Bed alarm on, 2/4 side rails up, call light in reach, fall band on, nonskid socks on, clear pathway, bed in locked and lowest position. Will continue to monitor. Problem: Risk for Impaired Skin Integrity  Goal: Tissue integrity - skin and mucous membranes  Description  Structural intactness and normal physiological function of skin and  mucous membranes. Outcome: Ongoing  Note:   Skin warm, dry, intact. Pt is continent and able to turn self. Mucous membranes pink, moist intact. Will continue to monitor. Problem: Nutrition  Goal: Optimal nutrition therapy  5/24/2019 0201 by Susana Odom RN  Outcome: Ongoing  Note:   Pt stated that he ate most of evening meal. Will continue to monitor. Problem: Discharge Planning:  Goal: Participates in care planning  Description  Participates in care planning  Outcome: Ongoing  Note:   Pt participates in care planning to the best of ability. Will continue to include in care planning. Goal: Discharged to appropriate level of care  Description  Discharged to appropriate level of care  Outcome: Ongoing  Note:   Pt plans to be discharged to home alone. Will continue to monitor for further discharge needs. Problem: Cardiac Output - Decreased:  Goal: Hemodynamic stability will improve  Description  Hemodynamic stability will improve  Outcome: Ongoing  Note:   Vitals:    05/23/19 2339   BP: (!) 163/70   Pulse: 77   Resp: 22   Temp: 98 °F (36.7 °C)   SpO2: 97%     Will continue to monitor.      Problem: Gas
Problem: Impaired respiratory status  Goal: Clear lung sounds  Outcome: Ongoing   Improve aeration of lungs, decrease WOB
no known mental health issues.

## 2019-05-24 NOTE — CARE COORDINATION
5/24/19, 7:15 AM      Doroteo Barthel       Admitted from: ED 5/23/2019/ [address] day: 1   Location: [address] [place] [address]-A Reason for admit: COPD exacerbation ([facility] [address] [facility] [J44.1] Status: IP  Admit order signed?: yes  PMH:  has a past medical history of Brain tumor ([facility] [address] COPD (chronic obstructive pulmonary disease) ([facility] [address] [facility] Coronary artery disease involving native coronary artery of native heart without angina pectoris, Essential hypertension, and S/P cardiac catheterization: 3/27/2017: 40-50% mid-LAD, 60-70% mid-Ramus with FFR of 0.9, and 50% mid-RCA. LVEF 25-30%. Severe AI 3+. .  Procedure: none  Pertinent abnormal Imaging:CTA Chest W WO Contrast    Impression:       1.  No pulmonary emboli are seen. 2. Moderate left ventriculomegaly. Findings of pulmonary arterial hypertension. Fibroemphysematous lungs. 3. Chronic atelectatic/fibrotic stranding superselective left lower lobe. Mild fibrotic scarring left lung base. 4. Mild groundglass appearance right posterior costophrenic sulcus, more prominent than on prior study, consistent with atelectasis/fibrosis/pneumonitis. CXR  Impression:        1. Moderate cardiomegaly. Cardiac silhouette has increased in size since prior study. 2. Lungs hyperinflated and fibroemphysematous in appearance. 3. Increased markings superior segment left lower lobe. Patient had pneumonia at the site of prior study. Current findings could be due to fibrosis versus mild recurrent atelectasis/pneumonia. 4. Slightly increased markings right midlung. Cannot exclude mild interstitial pneumonitis.   5. Minimal atelectatic/fibrotic stranding in the right posterior costophrenic angle           Medications:  Scheduled Meds:   ipratropium-albuterol  1 ampule Inhalation Q4H WA    methylPREDNISolone  40 mg Intravenous Daily    doxycycline (VIBRAMYCIN) IV  100 mg Intravenous Q12H    hydrALAZINE  25 mg Oral 3 times per day     Continuous Infusions:   Pertinent Info/Orders/Treatment Plan:  Troponin (-), IV steroids, IV antibiotics, telemetry, Duonebs. Diet: DIET CARDIAC; Dietary Nutrition Supplements: Standard High Calorie Oral Supplement   Smoking status:  reports that he has been smoking cigarettes. He has a 75.00 pack-year smoking history. He has never used smokeless tobacco.   PCP: Mady Schirmer, APRN - CNP  Readmission: no  Readmission Risk Score: 11%    Discharge Planning  Current Residence:  Private Residence  Living Arrangements:  Alone   Support Systems:  Children, Family Members(children and brother out of state)  Current Services PTA:     Potential Assistance Needed:  Durable Medical Equipment(walker)  Potential Assistance Purchasing Medications:  Yes  Does patient want to participate in local refill/ meds to beds program?  Yes  Type of Home Care Services:  None  Patient expects to be discharged to:  home  Expected Discharge date:  05/25/19  Follow Up Appointment: Best Day/ Time: Monday AM    Discharge Plan: Met with [facility] [place]. He currently lives at home alone. Plan is to return at discharge. He denies need for DME or HH.      Evaluation: no

## 2019-05-24 NOTE — TELEPHONE ENCOUNTER
.Transition of Care visit scheduled. 5/30/2019  Patient is being discharged to home.   Date of discharge 5/24/19  Discharge from facility [facility]  Reason for admission COPD    Normally see's Jaleel Burr CNP

## 2019-05-24 NOTE — DISCHARGE SUMMARY
Hospital Medicine Discharge Summary      Patient Identification:   Ta Mccarty   :   MRN: [id number]   Account: [de-identified]      Patient's PCP: JESSICA Polanco CNP    Admit Date: 2019     Discharge Date:   2019      Admitting Physician: Zach Lockett MD     Discharge Physician: Susanna Carreon MD     Discharge Diagnoses: Active Hospital Problems    Diagnosis Date Noted    Severe malnutrition ([facility] [E43] 2019    COPD exacerbation ([facility] [J44.1] 10/25/2017       The patient was seen and examined on day of discharge and this discharge summary is in conjunction with any daily progress note from day of discharge. Hospital Course:   Ta Mccarty is a 58 y.o. male admitted to [address] [place] [address] on 2019 for shortness of breath and leg weakness. The pt is a smoker, has aortic insufficiency and poor lv fx for which he did not follow up. He complained of his legs suddenly becoming weak and sob at the same time and came to ER where he was admitted. His stay was uneventful. He was treated for copd. His cxr was clear other than for a massive heart. Arrangements were made for home albuterol and he was started on lisinopril for his left ventricular dysfx. He had a cardiac cath in 2017 showing no significant cad but severe AI and surgery was discussed but he never followed up with Cardiology. He will presumably follow up with Rhys Powers. Addition of low dose beta blocker would be potentially useful. Referral to Cardiology could be considered but follow up is in question. He had fairly good pulses in his feet; his sudden leg weakness could be due to spinal stenosis. ABIS in the office could be considered. .              Exam:     Vitals:  Vitals:    19 0345 19 0922 19 1209 19 1241   BP: (!) 155/70 (!) 157/68 133/68 (!) 163/70   Pulse: 73 90 101 88   Resp:    Temp: 98.3 °F (36.8 °C) 99.1 °F (37.3 °C) 98.3 °F (36.8 °C) 98.8 °F (37.1 °C)   TempSrc: Oral Oral Oral Oral   SpO2: 98% 98% 97% 98%   Weight: 137 lb 8 oz (62.4 kg)      Height:         Weight: Weight: 137 lb 8 oz (62.4 kg)     24 hour intake/output:    Intake/Output Summary (Last 24 hours) at 5/24/2019 1302  Last data filed at 5/24/2019 0722  Gross per 24 hour   Intake 1187.76 ml   Output 2175 ml   Net -987.24 ml         General appearance:  No apparent distress, appears stated age and cooperative. HEENT:  Normal cephalic, atraumatic without obvious deformity. Pupils equal, round, and reactive to light. Extra ocular muscles intact. Conjunctivae/corneas clear. Neck: Supple, with full range of motion. No jugular venous distention. Trachea midline. Respiratory:  Normal respiratory effort. Clear to auscultation, bilaterally without Rales/Wheezes/Rhonchi. Cardiovascular:  Loud systolic murmur through precordium  Abdomen: Soft, non-tender, non-distended with normal bowel sounds. Musculoskeletal:  No clubbing, cyanosis or edema bilaterally. Full range of motion without deformity. Skin: Skin color, texture, turgor normal.  No rashes or lesions. Neurologic:  Neurovascularly intact without any focal sensory/motor deficits. Cranial nerves: II-XII intact, grossly non-focal.  Psychiatric:  Alert and oriented, thought content appropriate, normal insight  Capillary Refill: Brisk,< 3 seconds   Peripheral Pulses: +2 palpable, equal bilaterally       Labs:  For convenience and continuity at follow-up the following most recent labs are provided:      CBC:    Lab Results   Component Value Date    WBC 13.9 05/24/2019    HGB 15.8 05/24/2019    HCT 45.3 05/24/2019     05/24/2019       Renal:    Lab Results   Component Value Date     05/24/2019    K 4.1 05/24/2019    K 4.1 01/05/2019    CL 97 05/24/2019    CO2 26 05/24/2019    BUN 15 05/24/2019    CREATININE 0.7 05/24/2019    CALCIUM 9.7 05/24/2019    PHOS 4.8 03/26/2017         Significant Diagnostic Studies    Radiology:   CTA CHEST W WO CONTRAST   Final Result   1. No pulmonary emboli are seen. 2. Moderate left ventriculomegaly. Findings of pulmonary arterial hypertension. Fibroemphysematous lungs. 3. Chronic atelectatic/fibrotic stranding superselective left lower lobe. Mild fibrotic scarring left lung base. 4. Mild groundglass appearance right posterior costophrenic sulcus, more prominent than on prior study, consistent with atelectasis/fibrosis/pneumonitis. **This report has been created using voice recognition software. It may contain minor errors which are inherent in voice recognition technology. **          Final report electronically signed by Dr. Alexandre Ribeiro on 5/23/2019 11:40 AM      XR CHEST STANDARD (2 VW)   Final Result   1. Moderate cardiomegaly. Cardiac silhouette has increased in size since prior study. 2. Lungs hyperinflated and fibroemphysematous in appearance. 3. Increased markings superior segment left lower lobe. Patient had pneumonia at the site of prior study. Current findings could be due to fibrosis versus mild recurrent atelectasis/pneumonia. 4. Slightly increased markings right midlung. Cannot exclude mild interstitial pneumonitis. 5. Minimal atelectatic/fibrotic stranding in the right posterior costophrenic angle            **This report has been created using voice recognition software. It may contain minor errors which are inherent in voice recognition technology. **      Final report electronically signed by Dr. Alexandre Ribeiro on 5/23/2019 8:49 AM             Consults:     None    Disposition: Home  Condition at Discharge: Stable    Code Status:  Prior     Patient Instructions:    Discharge lab work: Activity: activity as tolerated  Diet: DIET CARDIAC;   Dietary Nutrition Supplements: Standard High Calorie Oral Supplement      Follow-up visits:   JESSICA Damon - [facility]  [address]  [phone]               Discharge Medications: There are no discharge medications for this patient. Time Spent on discharge is more than 30 minutes in the examination, evaluation, counseling and review of medications and discharge plan. Signed: Thank you JESSICA Mistry CNP for the opportunity to be involved in this patient's care.     Electronically signed by Allison Mckinnon MD on 5/24/2019 at 1:02 PM

## 2019-05-24 NOTE — CARE COORDINATION
5/24/19, 2:32 PM    DISCHARGE BARRIERS    SW notified that Patient needs medication assistance. SW checked with Pharmacy and medications were not in the dispensary  [facility] action completed and sent to pharmacy.

## 2019-05-24 NOTE — CARE COORDINATION
5/24/19, 2:01 PM    Discharge plan discussed by  and . Discharge plan reviewed with patient/ family. Patient/ family verbalize understanding of discharge plan and are in agreement with plan. Understanding was demonstrated using the teach back method. Pt to be discharged to home. Denies needs or services.

## 2019-06-06 NOTE — PROGRESS NOTES
CLINICAL PHARMACY NOTE: MEDS TO [address] Select Patient?: No  Total # of Prescriptions Filled: 2   The following medications were delivered to the patient:    Total # of Interventions Completed: 2  Time Spent (min): 30    Additional Documentation: